# Patient Record
Sex: FEMALE | Race: BLACK OR AFRICAN AMERICAN | NOT HISPANIC OR LATINO | Employment: OTHER | URBAN - METROPOLITAN AREA
[De-identification: names, ages, dates, MRNs, and addresses within clinical notes are randomized per-mention and may not be internally consistent; named-entity substitution may affect disease eponyms.]

---

## 2024-07-21 ENCOUNTER — APPOINTMENT (OUTPATIENT)
Dept: RADIOLOGY | Facility: HOSPITAL | Age: 54
DRG: 101 | End: 2024-07-21
Payer: COMMERCIAL

## 2024-07-21 ENCOUNTER — APPOINTMENT (OUTPATIENT)
Dept: CARDIOLOGY | Facility: HOSPITAL | Age: 54
DRG: 101 | End: 2024-07-21
Payer: COMMERCIAL

## 2024-07-21 ENCOUNTER — HOSPITAL ENCOUNTER (INPATIENT)
Facility: HOSPITAL | Age: 54
DRG: 101 | End: 2024-07-21
Attending: STUDENT IN AN ORGANIZED HEALTH CARE EDUCATION/TRAINING PROGRAM | Admitting: INTERNAL MEDICINE
Payer: COMMERCIAL

## 2024-07-21 VITALS
RESPIRATION RATE: 16 BRPM | BODY MASS INDEX: 30.82 KG/M2 | SYSTOLIC BLOOD PRESSURE: 177 MMHG | OXYGEN SATURATION: 99 % | HEART RATE: 84 BPM | DIASTOLIC BLOOD PRESSURE: 99 MMHG | WEIGHT: 185 LBS | HEIGHT: 65 IN | TEMPERATURE: 98.6 F

## 2024-07-21 DIAGNOSIS — G35 HISTORY OF MULTIPLE SCLEROSIS (MULTI): ICD-10-CM

## 2024-07-21 DIAGNOSIS — R56.9 SEIZURE (MULTI): Primary | ICD-10-CM

## 2024-07-21 LAB
ALBUMIN SERPL BCP-MCNC: 4.1 G/DL (ref 3.4–5)
ALP SERPL-CCNC: 134 U/L (ref 33–110)
ALT SERPL W P-5'-P-CCNC: 13 U/L (ref 7–45)
AMPHETAMINES UR QL SCN: ABNORMAL
ANION GAP SERPL CALC-SCNC: 27 MMOL/L (ref 10–20)
APPEARANCE UR: CLEAR
AST SERPL W P-5'-P-CCNC: 31 U/L (ref 9–39)
B-HCG SERPL-ACNC: 3 MIU/ML
BARBITURATES UR QL SCN: ABNORMAL
BASOPHILS # BLD AUTO: 0.03 X10*3/UL (ref 0–0.1)
BASOPHILS NFR BLD AUTO: 0.4 %
BENZODIAZ UR QL SCN: ABNORMAL
BILIRUB SERPL-MCNC: 0.3 MG/DL (ref 0–1.2)
BILIRUB UR STRIP.AUTO-MCNC: NEGATIVE MG/DL
BUN SERPL-MCNC: 14 MG/DL (ref 6–23)
BZE UR QL SCN: ABNORMAL
CALCIUM SERPL-MCNC: 9.1 MG/DL (ref 8.6–10.3)
CANNABINOIDS UR QL SCN: ABNORMAL
CHLORIDE SERPL-SCNC: 104 MMOL/L (ref 98–107)
CO2 SERPL-SCNC: 14 MMOL/L (ref 21–32)
COLOR UR: YELLOW
CREAT SERPL-MCNC: 0.97 MG/DL (ref 0.5–1.05)
EGFRCR SERPLBLD CKD-EPI 2021: 69 ML/MIN/1.73M*2
EOSINOPHIL # BLD AUTO: 0.04 X10*3/UL (ref 0–0.7)
EOSINOPHIL NFR BLD AUTO: 0.6 %
ERYTHROCYTE [DISTWIDTH] IN BLOOD BY AUTOMATED COUNT: 15.8 % (ref 11.5–14.5)
FENTANYL+NORFENTANYL UR QL SCN: ABNORMAL
GLUCOSE BLD MANUAL STRIP-MCNC: 94 MG/DL (ref 74–99)
GLUCOSE BLD MANUAL STRIP-MCNC: 99 MG/DL (ref 74–99)
GLUCOSE SERPL-MCNC: 87 MG/DL (ref 74–99)
GLUCOSE UR STRIP.AUTO-MCNC: NORMAL MG/DL
HCT VFR BLD AUTO: 42.2 % (ref 36–46)
HGB BLD-MCNC: 12.5 G/DL (ref 12–16)
IMM GRANULOCYTES # BLD AUTO: 0.04 X10*3/UL (ref 0–0.7)
IMM GRANULOCYTES NFR BLD AUTO: 0.6 % (ref 0–0.9)
KETONES UR STRIP.AUTO-MCNC: NEGATIVE MG/DL
LACTATE SERPL-SCNC: 0.8 MMOL/L (ref 0.4–2)
LACTATE SERPL-SCNC: 12.6 MMOL/L (ref 0.4–2)
LEUKOCYTE ESTERASE UR QL STRIP.AUTO: ABNORMAL
LYMPHOCYTES # BLD AUTO: 3.34 X10*3/UL (ref 1.2–4.8)
LYMPHOCYTES NFR BLD AUTO: 47.4 %
MAGNESIUM SERPL-MCNC: 1.8 MG/DL (ref 1.6–2.4)
MAGNESIUM SERPL-MCNC: 1.9 MG/DL (ref 1.6–2.4)
MCH RBC QN AUTO: 27.1 PG (ref 26–34)
MCHC RBC AUTO-ENTMCNC: 29.6 G/DL (ref 32–36)
MCV RBC AUTO: 92 FL (ref 80–100)
METHADONE UR QL SCN: ABNORMAL
MONOCYTES # BLD AUTO: 1.08 X10*3/UL (ref 0.1–1)
MONOCYTES NFR BLD AUTO: 15.3 %
MUCOUS THREADS #/AREA URNS AUTO: NORMAL /LPF
NEUTROPHILS # BLD AUTO: 2.51 X10*3/UL (ref 1.2–7.7)
NEUTROPHILS NFR BLD AUTO: 35.7 %
NITRITE UR QL STRIP.AUTO: NEGATIVE
NRBC BLD-RTO: 0 /100 WBCS (ref 0–0)
OPIATES UR QL SCN: ABNORMAL
OXYCODONE+OXYMORPHONE UR QL SCN: ABNORMAL
PCP UR QL SCN: ABNORMAL
PH UR STRIP.AUTO: 6 [PH]
PLATELET # BLD AUTO: 363 X10*3/UL (ref 150–450)
POTASSIUM SERPL-SCNC: 3.5 MMOL/L (ref 3.5–5.3)
POTASSIUM SERPL-SCNC: 4.4 MMOL/L (ref 3.5–5.3)
PROT SERPL-MCNC: 7.1 G/DL (ref 6.4–8.2)
PROT UR STRIP.AUTO-MCNC: ABNORMAL MG/DL
RBC # BLD AUTO: 4.61 X10*6/UL (ref 4–5.2)
RBC # UR STRIP.AUTO: NEGATIVE /UL
RBC #/AREA URNS AUTO: NORMAL /HPF
SODIUM SERPL-SCNC: 141 MMOL/L (ref 136–145)
SP GR UR STRIP.AUTO: 1.03
SQUAMOUS #/AREA URNS AUTO: NORMAL /HPF
UROBILINOGEN UR STRIP.AUTO-MCNC: ABNORMAL MG/DL
WBC # BLD AUTO: 7 X10*3/UL (ref 4.4–11.3)
WBC #/AREA URNS AUTO: NORMAL /HPF

## 2024-07-21 PROCEDURE — 96366 THER/PROPH/DIAG IV INF ADDON: CPT

## 2024-07-21 PROCEDURE — 93005 ELECTROCARDIOGRAM TRACING: CPT

## 2024-07-21 PROCEDURE — 99285 EMERGENCY DEPT VISIT HI MDM: CPT

## 2024-07-21 PROCEDURE — 80307 DRUG TEST PRSMV CHEM ANLYZR: CPT | Performed by: STUDENT IN AN ORGANIZED HEALTH CARE EDUCATION/TRAINING PROGRAM

## 2024-07-21 PROCEDURE — 82947 ASSAY GLUCOSE BLOOD QUANT: CPT

## 2024-07-21 PROCEDURE — 2500000001 HC RX 250 WO HCPCS SELF ADMINISTERED DRUGS (ALT 637 FOR MEDICARE OP): Performed by: INTERNAL MEDICINE

## 2024-07-21 PROCEDURE — 70450 CT HEAD/BRAIN W/O DYE: CPT | Performed by: STUDENT IN AN ORGANIZED HEALTH CARE EDUCATION/TRAINING PROGRAM

## 2024-07-21 PROCEDURE — 96365 THER/PROPH/DIAG IV INF INIT: CPT | Mod: 59

## 2024-07-21 PROCEDURE — 1200000002 HC GENERAL ROOM WITH TELEMETRY DAILY

## 2024-07-21 PROCEDURE — 2500000004 HC RX 250 GENERAL PHARMACY W/ HCPCS (ALT 636 FOR OP/ED): Performed by: STUDENT IN AN ORGANIZED HEALTH CARE EDUCATION/TRAINING PROGRAM

## 2024-07-21 PROCEDURE — 99222 1ST HOSP IP/OBS MODERATE 55: CPT | Performed by: INTERNAL MEDICINE

## 2024-07-21 PROCEDURE — 83735 ASSAY OF MAGNESIUM: CPT | Performed by: STUDENT IN AN ORGANIZED HEALTH CARE EDUCATION/TRAINING PROGRAM

## 2024-07-21 PROCEDURE — 36415 COLL VENOUS BLD VENIPUNCTURE: CPT | Performed by: STUDENT IN AN ORGANIZED HEALTH CARE EDUCATION/TRAINING PROGRAM

## 2024-07-21 PROCEDURE — 80053 COMPREHEN METABOLIC PANEL: CPT | Performed by: STUDENT IN AN ORGANIZED HEALTH CARE EDUCATION/TRAINING PROGRAM

## 2024-07-21 PROCEDURE — 84132 ASSAY OF SERUM POTASSIUM: CPT | Performed by: STUDENT IN AN ORGANIZED HEALTH CARE EDUCATION/TRAINING PROGRAM

## 2024-07-21 PROCEDURE — 71045 X-RAY EXAM CHEST 1 VIEW: CPT | Performed by: STUDENT IN AN ORGANIZED HEALTH CARE EDUCATION/TRAINING PROGRAM

## 2024-07-21 PROCEDURE — 85025 COMPLETE CBC W/AUTO DIFF WBC: CPT | Performed by: STUDENT IN AN ORGANIZED HEALTH CARE EDUCATION/TRAINING PROGRAM

## 2024-07-21 PROCEDURE — 96374 THER/PROPH/DIAG INJ IV PUSH: CPT | Mod: 59

## 2024-07-21 PROCEDURE — 84702 CHORIONIC GONADOTROPIN TEST: CPT | Performed by: STUDENT IN AN ORGANIZED HEALTH CARE EDUCATION/TRAINING PROGRAM

## 2024-07-21 PROCEDURE — 96375 TX/PRO/DX INJ NEW DRUG ADDON: CPT

## 2024-07-21 PROCEDURE — 2500000004 HC RX 250 GENERAL PHARMACY W/ HCPCS (ALT 636 FOR OP/ED): Performed by: INTERNAL MEDICINE

## 2024-07-21 PROCEDURE — 70450 CT HEAD/BRAIN W/O DYE: CPT

## 2024-07-21 PROCEDURE — 87086 URINE CULTURE/COLONY COUNT: CPT | Mod: AHULAB | Performed by: STUDENT IN AN ORGANIZED HEALTH CARE EDUCATION/TRAINING PROGRAM

## 2024-07-21 PROCEDURE — 83605 ASSAY OF LACTIC ACID: CPT | Performed by: STUDENT IN AN ORGANIZED HEALTH CARE EDUCATION/TRAINING PROGRAM

## 2024-07-21 PROCEDURE — 2500000005 HC RX 250 GENERAL PHARMACY W/O HCPCS: Performed by: INTERNAL MEDICINE

## 2024-07-21 PROCEDURE — 2500000004 HC RX 250 GENERAL PHARMACY W/ HCPCS (ALT 636 FOR OP/ED)

## 2024-07-21 PROCEDURE — 96372 THER/PROPH/DIAG INJ SC/IM: CPT | Performed by: STUDENT IN AN ORGANIZED HEALTH CARE EDUCATION/TRAINING PROGRAM

## 2024-07-21 PROCEDURE — 81001 URINALYSIS AUTO W/SCOPE: CPT | Performed by: STUDENT IN AN ORGANIZED HEALTH CARE EDUCATION/TRAINING PROGRAM

## 2024-07-21 PROCEDURE — 71045 X-RAY EXAM CHEST 1 VIEW: CPT

## 2024-07-21 RX ORDER — MAGNESIUM SULFATE HEPTAHYDRATE 40 MG/ML
2 INJECTION, SOLUTION INTRAVENOUS ONCE
Status: COMPLETED | OUTPATIENT
Start: 2024-07-21 | End: 2024-07-21

## 2024-07-21 RX ORDER — PROCHLORPERAZINE EDISYLATE 5 MG/ML
10 INJECTION INTRAMUSCULAR; INTRAVENOUS ONCE
Status: COMPLETED | OUTPATIENT
Start: 2024-07-21 | End: 2024-07-21

## 2024-07-21 RX ORDER — LORAZEPAM 2 MG/ML
INJECTION INTRAMUSCULAR
Status: COMPLETED
Start: 2024-07-21 | End: 2024-07-21

## 2024-07-21 RX ORDER — ACETAMINOPHEN 325 MG/1
975 TABLET ORAL ONCE
Status: COMPLETED | OUTPATIENT
Start: 2024-07-21 | End: 2024-07-21

## 2024-07-21 RX ORDER — LEVETIRACETAM 10 MG/ML
1000 INJECTION INTRAVASCULAR ONCE
Status: COMPLETED | OUTPATIENT
Start: 2024-07-21 | End: 2024-07-21

## 2024-07-21 RX ORDER — ONDANSETRON HYDROCHLORIDE 2 MG/ML
4 INJECTION, SOLUTION INTRAVENOUS EVERY 8 HOURS PRN
Status: DISCONTINUED | OUTPATIENT
Start: 2024-07-21 | End: 2024-07-24 | Stop reason: HOSPADM

## 2024-07-21 RX ORDER — PANTOPRAZOLE SODIUM 40 MG/10ML
40 INJECTION, POWDER, LYOPHILIZED, FOR SOLUTION INTRAVENOUS
Status: DISCONTINUED | OUTPATIENT
Start: 2024-07-22 | End: 2024-07-24 | Stop reason: HOSPADM

## 2024-07-21 RX ORDER — LEVETIRACETAM 500 MG/1
500 TABLET ORAL 2 TIMES DAILY
Status: DISCONTINUED | OUTPATIENT
Start: 2024-07-22 | End: 2024-07-24 | Stop reason: HOSPADM

## 2024-07-21 RX ORDER — ACETAMINOPHEN 325 MG/1
650 TABLET ORAL EVERY 4 HOURS PRN
Status: DISCONTINUED | OUTPATIENT
Start: 2024-07-21 | End: 2024-07-24 | Stop reason: HOSPADM

## 2024-07-21 RX ORDER — ACETAMINOPHEN 160 MG/5ML
650 SOLUTION ORAL EVERY 4 HOURS PRN
Status: DISCONTINUED | OUTPATIENT
Start: 2024-07-21 | End: 2024-07-24 | Stop reason: HOSPADM

## 2024-07-21 RX ORDER — PANTOPRAZOLE SODIUM 40 MG/1
40 TABLET, DELAYED RELEASE ORAL
Status: DISCONTINUED | OUTPATIENT
Start: 2024-07-22 | End: 2024-07-24 | Stop reason: HOSPADM

## 2024-07-21 RX ORDER — SODIUM CHLORIDE 9 MG/ML
100 INJECTION, SOLUTION INTRAVENOUS CONTINUOUS
Status: DISCONTINUED | OUTPATIENT
Start: 2024-07-21 | End: 2024-07-22

## 2024-07-21 RX ORDER — MIDAZOLAM HYDROCHLORIDE 1 MG/ML
2 INJECTION, SOLUTION INTRAMUSCULAR; INTRAVENOUS ONCE
Status: COMPLETED | OUTPATIENT
Start: 2024-07-21 | End: 2024-07-21

## 2024-07-21 RX ORDER — ENOXAPARIN SODIUM 100 MG/ML
40 INJECTION SUBCUTANEOUS EVERY 24 HOURS
Status: DISCONTINUED | OUTPATIENT
Start: 2024-07-21 | End: 2024-07-24 | Stop reason: HOSPADM

## 2024-07-21 RX ORDER — ACETAMINOPHEN 650 MG/1
650 SUPPOSITORY RECTAL EVERY 4 HOURS PRN
Status: DISCONTINUED | OUTPATIENT
Start: 2024-07-21 | End: 2024-07-24 | Stop reason: HOSPADM

## 2024-07-21 RX ORDER — DROPERIDOL 2.5 MG/ML
1.25 INJECTION, SOLUTION INTRAMUSCULAR; INTRAVENOUS ONCE
Status: COMPLETED | OUTPATIENT
Start: 2024-07-21 | End: 2024-07-21

## 2024-07-21 RX ORDER — CLONIDINE HYDROCHLORIDE 0.1 MG/1
0.1 TABLET ORAL EVERY 12 HOURS SCHEDULED
Status: DISCONTINUED | OUTPATIENT
Start: 2024-07-21 | End: 2024-07-23

## 2024-07-21 RX ORDER — METOPROLOL TARTRATE 50 MG/1
50 TABLET ORAL 2 TIMES DAILY
Status: DISCONTINUED | OUTPATIENT
Start: 2024-07-21 | End: 2024-07-23

## 2024-07-21 RX ORDER — HYDROMORPHONE HYDROCHLORIDE 0.2 MG/ML
0.2 INJECTION INTRAMUSCULAR; INTRAVENOUS; SUBCUTANEOUS EVERY 4 HOURS PRN
Status: DISCONTINUED | OUTPATIENT
Start: 2024-07-21 | End: 2024-07-24 | Stop reason: HOSPADM

## 2024-07-21 RX ORDER — ONDANSETRON 4 MG/1
4 TABLET, FILM COATED ORAL EVERY 8 HOURS PRN
Status: DISCONTINUED | OUTPATIENT
Start: 2024-07-21 | End: 2024-07-24 | Stop reason: HOSPADM

## 2024-07-21 RX ORDER — METOPROLOL TARTRATE 1 MG/ML
5 INJECTION, SOLUTION INTRAVENOUS ONCE
Status: COMPLETED | OUTPATIENT
Start: 2024-07-21 | End: 2024-07-21

## 2024-07-21 RX ADMIN — ONDANSETRON 4 MG: 2 INJECTION INTRAMUSCULAR; INTRAVENOUS at 23:15

## 2024-07-21 RX ADMIN — ACETAMINOPHEN 650 MG: 325 TABLET ORAL at 21:02

## 2024-07-21 RX ADMIN — HYDROMORPHONE HYDROCHLORIDE 0.4 MG: 1 INJECTION, SOLUTION INTRAMUSCULAR; INTRAVENOUS; SUBCUTANEOUS at 23:11

## 2024-07-21 RX ADMIN — HYDROMORPHONE HYDROCHLORIDE 0.2 MG: 0.2 INJECTION, SOLUTION INTRAMUSCULAR; INTRAVENOUS; SUBCUTANEOUS at 21:34

## 2024-07-21 RX ADMIN — METOPROLOL TARTRATE 5 MG: 5 INJECTION INTRAVENOUS at 21:33

## 2024-07-21 RX ADMIN — CLONIDINE HYDROCHLORIDE 0.1 MG: 0.1 TABLET ORAL at 21:02

## 2024-07-21 RX ADMIN — LORAZEPAM: 2 INJECTION, SOLUTION INTRAMUSCULAR; INTRAVENOUS at 21:30

## 2024-07-21 RX ADMIN — DROPERIDOL 1.25 MG: 2.5 INJECTION, SOLUTION INTRAMUSCULAR; INTRAVENOUS at 17:37

## 2024-07-21 RX ADMIN — ENOXAPARIN SODIUM 40 MG: 40 INJECTION SUBCUTANEOUS at 21:33

## 2024-07-21 RX ADMIN — SODIUM CHLORIDE 100 ML/HR: 9 INJECTION, SOLUTION INTRAVENOUS at 21:23

## 2024-07-21 RX ADMIN — DEXTROSE MONOHYDRATE 1000 MG: 50 INJECTION, SOLUTION INTRAVENOUS at 18:17

## 2024-07-21 RX ADMIN — MIDAZOLAM HYDROCHLORIDE 2 MG: 1 INJECTION, SOLUTION INTRAMUSCULAR; INTRAVENOUS at 15:45

## 2024-07-21 RX ADMIN — MAGNESIUM SULFATE HEPTAHYDRATE 2 G: 40 INJECTION, SOLUTION INTRAVENOUS at 16:16

## 2024-07-21 RX ADMIN — ACETAMINOPHEN 975 MG: 325 TABLET ORAL at 16:16

## 2024-07-21 RX ADMIN — LEVETIRACETAM 1000 MG: 10 INJECTION INTRAVENOUS at 21:02

## 2024-07-21 RX ADMIN — PROCHLORPERAZINE EDISYLATE 10 MG: 5 INJECTION INTRAMUSCULAR; INTRAVENOUS at 16:16

## 2024-07-21 RX ADMIN — SODIUM CHLORIDE, POTASSIUM CHLORIDE, SODIUM LACTATE AND CALCIUM CHLORIDE 1000 ML: 600; 310; 30; 20 INJECTION, SOLUTION INTRAVENOUS at 16:16

## 2024-07-21 ASSESSMENT — ENCOUNTER SYMPTOMS
WEAKNESS: 1
RESPIRATORY NEGATIVE: 1
FATIGUE: 1
MUSCULOSKELETAL NEGATIVE: 1
ENDOCRINE NEGATIVE: 1
ALLERGIC/IMMUNOLOGIC NEGATIVE: 1
PSYCHIATRIC NEGATIVE: 1
EYES NEGATIVE: 1
CARDIOVASCULAR NEGATIVE: 1
ACTIVITY CHANGE: 1
SEIZURES: 1
GASTROINTESTINAL NEGATIVE: 1
HEMATOLOGIC/LYMPHATIC NEGATIVE: 1

## 2024-07-21 ASSESSMENT — PAIN SCALES - GENERAL
PAINLEVEL_OUTOF10: 10 - WORST POSSIBLE PAIN
PAINLEVEL_OUTOF10: 10 - WORST POSSIBLE PAIN
PAINLEVEL_OUTOF10: 7
PAINLEVEL_OUTOF10: 10 - WORST POSSIBLE PAIN
PAINLEVEL_OUTOF10: 0 - NO PAIN

## 2024-07-21 ASSESSMENT — PAIN - FUNCTIONAL ASSESSMENT
PAIN_FUNCTIONAL_ASSESSMENT: 0-10

## 2024-07-21 NOTE — ED NOTES
Female RN present while straight cath completed. Urine specimen obtained and sent to lab.      Adalberto Chester RN  07/21/24 5579

## 2024-07-21 NOTE — H&P
History Of Present Illness  Schoen D Traywick is a 53 y.o. female with a past medical history of multiple sclerosis with recent MS flare discharged on 7/9/2024 who presented via EMS to [facility] ER after she had an apparent seizure.  Her family endorses that she had generalized tonic-clonic type activity followed by postictal type recovery state.  While in the ER she had 2 more episodes of general tonic-clonic activity but was immediately awake and alert afterwards.  Her lactate was 12. he was loaded with Keppra in the ER, she also complains of feeling weak all over like she might be having a MS flare.  She resume her 1000 mg of Solu-Medrol in the ER.  Denies any fever or chills chest pain or shortness of breath.  No abdominal pain nausea vomiting or diarrhea.     Past Medical History  Past Medical History:   Diagnosis Date    Other conditions influencing health status 04/29/2015    Mammogram normal        Surgical History  No past surgical history on file.      Social History  She has no history on file for tobacco use, alcohol use, and drug use.    Family History  No family history on file.     Allergies  Patient has no allergy information on record.    Review of Systems   Constitutional:  Positive for activity change and fatigue.   HENT: Negative.     Eyes: Negative.    Respiratory: Negative.     Cardiovascular: Negative.    Gastrointestinal: Negative.    Endocrine: Negative.    Genitourinary: Negative.    Musculoskeletal: Negative.    Skin: Negative.    Allergic/Immunologic: Negative.    Neurological:  Positive for seizures and weakness.        Generalized weakness   Hematological: Negative.    Psychiatric/Behavioral: Negative.     All other systems reviewed and are negative.       Physical Exam  Vitals and nursing note reviewed.   Constitutional:       Appearance: Normal appearance. She is obese.   HENT:      Head: Normocephalic.      Right Ear: External ear normal.      Left Ear: External ear  "normal.      Nose: Nose normal.      Mouth/Throat:      Mouth: Mucous membranes are dry.      Pharynx: Oropharynx is clear.   Eyes:      Extraocular Movements: Extraocular movements intact.      Conjunctiva/sclera: Conjunctivae normal.      Pupils: Pupils are equal, round, and reactive to light.   Cardiovascular:      Rate and Rhythm: Normal rate and regular rhythm.   Pulmonary:      Effort: Pulmonary effort is normal.      Breath sounds: Normal breath sounds.   Abdominal:      General: Abdomen is flat. Bowel sounds are normal.      Palpations: Abdomen is soft.   Musculoskeletal:         General: Normal range of motion.   Skin:     General: Skin is warm and dry.   Neurological:      General: No focal deficit present.      Mental Status: She is alert. Mental status is at baseline.   Psychiatric:         Mood and Affect: Mood normal.         Behavior: Behavior normal.          Last Recorded Vitals  Blood pressure (!) 162/97, pulse 78, temperature 37.1 °C (98.8 °F), temperature source Oral, resp. rate 16, height 1.651 m (5' 5\"), weight 83.9 kg (185 lb), SpO2 100%.    Relevant Results  Meds:  Scheduled medications  enoxaparin, 40 mg, subcutaneous, q24h  [START ON 7/22/2024] pantoprazole, 40 mg, oral, Daily before breakfast   Or  [START ON 7/22/2024] pantoprazole, 40 mg, intravenous, Daily before breakfast      Continuous medications  sodium chloride 0.9%, 100 mL/hr      PRN medications  PRN medications: acetaminophen **OR** acetaminophen **OR** acetaminophen, ondansetron **OR** ondansetron   No current outpatient medications     Labs:  Results for orders placed or performed during the hospital encounter of 07/21/24 (from the past 24 hour(s))   POCT GLUCOSE   Result Value Ref Range    POCT Glucose 94 74 - 99 mg/dL   CBC and Auto Differential   Result Value Ref Range    WBC 7.0 4.4 - 11.3 x10*3/uL    nRBC 0.0 0.0 - 0.0 /100 WBCs    RBC 4.61 4.00 - 5.20 x10*6/uL    Hemoglobin 12.5 12.0 - 16.0 g/dL    Hematocrit 42.2 36.0 - " 46.0 %    MCV 92 80 - 100 fL    MCH 27.1 26.0 - 34.0 pg    MCHC 29.6 (L) 32.0 - 36.0 g/dL    RDW 15.8 (H) 11.5 - 14.5 %    Platelets 363 150 - 450 x10*3/uL    Neutrophils % 35.7 40.0 - 80.0 %    Immature Granulocytes %, Automated 0.6 0.0 - 0.9 %    Lymphocytes % 47.4 13.0 - 44.0 %    Monocytes % 15.3 2.0 - 10.0 %    Eosinophils % 0.6 0.0 - 6.0 %    Basophils % 0.4 0.0 - 2.0 %    Neutrophils Absolute 2.51 1.20 - 7.70 x10*3/uL    Immature Granulocytes Absolute, Automated 0.04 0.00 - 0.70 x10*3/uL    Lymphocytes Absolute 3.34 1.20 - 4.80 x10*3/uL    Monocytes Absolute 1.08 (H) 0.10 - 1.00 x10*3/uL    Eosinophils Absolute 0.04 0.00 - 0.70 x10*3/uL    Basophils Absolute 0.03 0.00 - 0.10 x10*3/uL   Comprehensive metabolic panel   Result Value Ref Range    Glucose 87 74 - 99 mg/dL    Sodium 141 136 - 145 mmol/L    Potassium 4.4 3.5 - 5.3 mmol/L    Chloride 104 98 - 107 mmol/L    Bicarbonate 14 (L) 21 - 32 mmol/L    Anion Gap 27 (H) 10 - 20 mmol/L    Urea Nitrogen 14 6 - 23 mg/dL    Creatinine 0.97 0.50 - 1.05 mg/dL    eGFR 69 >60 mL/min/1.73m*2    Calcium 9.1 8.6 - 10.3 mg/dL    Albumin 4.1 3.4 - 5.0 g/dL    Alkaline Phosphatase 134 (H) 33 - 110 U/L    Total Protein 7.1 6.4 - 8.2 g/dL    AST 31 9 - 39 U/L    Bilirubin, Total 0.3 0.0 - 1.2 mg/dL    ALT 13 7 - 45 U/L   Magnesium   Result Value Ref Range    Magnesium 1.90 1.60 - 2.40 mg/dL   Lactate   Result Value Ref Range    Lactate 12.6 (HH) 0.4 - 2.0 mmol/L   Human Chorionic Gonadotropin, Serum Quantitative   Result Value Ref Range    HCG, Beta-Quantitative 3 <5 mIU/mL   Drug Screen, Urine   Result Value Ref Range    Amphetamine Screen, Urine Presumptive Negative Presumptive Negative    Barbiturate Screen, Urine Presumptive Positive (A) Presumptive Negative    Benzodiazepines Screen, Urine Presumptive Positive (A) Presumptive Negative    Cannabinoid Screen, Urine Presumptive Positive (A) Presumptive Negative    Cocaine Metabolite Screen, Urine Presumptive Negative  Presumptive Negative    Fentanyl Screen, Urine Presumptive Negative Presumptive Negative    Opiate Screen, Urine Presumptive Negative Presumptive Negative    Oxycodone Screen, Urine Presumptive Positive (A) Presumptive Negative    PCP Screen, Urine Presumptive Negative Presumptive Negative    Methadone Screen, Urine Presumptive Negative Presumptive Negative   Urinalysis with Reflex Culture and Microscopic   Result Value Ref Range    Color, Urine Yellow Light-Yellow, Yellow, Dark-Yellow    Appearance, Urine Clear Clear    Specific Gravity, Urine 1.029 1.005 - 1.035    pH, Urine 6.0 5.0, 5.5, 6.0, 6.5, 7.0, 7.5, 8.0    Protein, Urine 30 (1+) (A) NEGATIVE, 10 (TRACE), 20 (TRACE) mg/dL    Glucose, Urine Normal Normal mg/dL    Blood, Urine NEGATIVE NEGATIVE    Ketones, Urine NEGATIVE NEGATIVE mg/dL    Bilirubin, Urine NEGATIVE NEGATIVE    Urobilinogen, Urine 2 (1+) (A) Normal mg/dL    Nitrite, Urine NEGATIVE NEGATIVE    Leukocyte Esterase, Urine 25 Rob/µL (A) NEGATIVE   Microscopic Only, Urine   Result Value Ref Range    WBC, Urine 1-5 1-5, NONE /HPF    RBC, Urine NONE NONE, 1-2, 3-5 /HPF    Squamous Epithelial Cells, Urine 1-9 (SPARSE) Reference range not established. /HPF    Mucus, Urine FEW Reference range not established. /LPF   Potassium   Result Value Ref Range    Potassium 3.5 3.5 - 5.3 mmol/L   Magnesium   Result Value Ref Range    Magnesium 1.80 1.60 - 2.40 mg/dL   Lactate   Result Value Ref Range    Lactate 0.8 0.4 - 2.0 mmol/L      Imaging:  CT head wo IV contrast    Result Date: 7/21/2024  Interpreted By:  Josiah Henry, STUDY: CT HEAD WO IV CONTRAST;  7/21/2024 5:02 pm   INDICATION: Signs/Symptoms:seizures.   COMPARISON: None.   ACCESSION NUMBER(S): [id number]   ORDERING CLINICIAN: STEFFEN SIMERLINK   TECHNIQUE: Noncontrast axial CT scan of head was performed.   FINDINGS: Parenchyma: There is no intracranial hemorrhage. The grey-white differentiation is intact. There is no mass effect or midline shift.    CSF Spaces: The ventricles, sulci and basal cisterns are within normal limits for age.   Extra-Axial Fluid: There is no extraaxial fluid collection.   Calvarium: The calvarium is unremarkable.   Paranasal sinuses: Visualized paranasal sinuses are clear.   Mastoids: Clear.   Orbits: Normal.   Soft tissues: Unremarkable.       No acute intracranial hemorrhage, mass effect, or CT apparent acute infarct.   Signed by: Josiah Henry 7/21/2024 5:27 PM Dictation workstation:   [id number]    XR chest 1 view    Result Date: 7/21/2024  Interpreted By:  Kristine Arciniega, STUDY: XR CHEST 1 VIEW; 7/21/2024 4:06 pm   INDICATION: Signs/Symptoms:AMS   COMPARISON: Chest radiographs 07/08/2009   ACCESSION NUMBER(S): [id number]   ORDERING CLINICIAN: STEFFEN SIMERLINK   TECHNIQUE: Single frontal view of the chest performed.   FINDINGS: LINES AND DEVICES: New right IJ Port-A-Cath with tip in the right atrium..   LUNGS: Patchy airspace opacities in the right lung base. No pulmonary edema, pleural effusion or pneumothorax.   CARDIOMEDIASTINAL SILHOUETTE: The cardiomediastinal silhouette is within normal limits.       Mild right basilar atelectasis and/or airspace disease. Consider follow-up to resolution in 1-2 months.   MACRO None   Signed by: Kristine Arciniega 7/21/2024 4:33 PM Dictation workstation:   [id number]      Assessment/Plan   Seizure-like activity  Plan:  Loaded with Keppra in the ER, will continue Keppra 500 mg twice daily until seen by neurology.  Routine EEG    Generalized weakness possible MS flare  Plan:  Solu-Medrol 1000 mg IV given in the ER at 1000 mg for 2 more days  Await neurology input    Hypertension  Plan:  Will add clonidine 0.1 mg every 12 hours for now and reassess    Class I obesity BMI 30  Lifestyle modification    DVT prophylaxis  Plan:  Lovenox 40 mg subcutaneously daily  SCDs    I spent 60 minutes in the professional and overall care of this patient.      Adeel Queen DO

## 2024-07-21 NOTE — ED NOTES
"Pt sitting in the room quiet, when RN walked into the room pt started moaning. When asked what was wrong she said she was having pain. RN expressed tylenol was administered and pt responded with \"tylenol doesn't work\" repeat blood work drawn at this time. MD notified about pain.      Adalberto Chester RN  07/21/24 [id number]    "

## 2024-07-21 NOTE — ED NOTES
Pt medicated per orders. Pt able to follow commands and passed swallow eval. Karely tylenol; administered.      Adalberto Chester RN  07/21/24 5838

## 2024-07-21 NOTE — ED NOTES
Room phone hooked up and patient calling her family member. Pt requesting pain medication. MD notified.      Adalberto Chester RN  07/21/24 6144

## 2024-07-21 NOTE — ED PROVIDER NOTES
Emergency Department Provider Note        History of Present Illness     Chief Complaint: Seizure  History provided by: Patient and EMS  Limitations to History: Altered Mental Status  External Chart Review: See ED Course    HPI:  Schoen D Traywick is a 53 y.o. female with PMHx of multiple sclerosis presenting to the ED for seizure.    I personally discussed history with EMS who reports family called for the patient having a seizure.  They do not have any information as to how long the seizure lasted but reports that the patient has seemed postictal since.  They report normal D stick.  Did not administer any medications.  Family reported to them that patient has a history of MS.    History from patient is limited secondary to altered mental status, but patient nods that she has a history of MS    Physical Exam   Triage vitals:  T 37.1 °C (98.8 °F)  HR 97  BP (!) 183/106  RR 20  O2 99 % None (Room air)    Constitutional: Awake, alert, looking around room  HENT: Head atraumatic, mucous membranes moist  Eyes:  Conjunctivae normal  Neck:  Supple  Lungs: Clear to auscultation, breath sounds equal and symmetric, no wheezes, rales, or rhonchi  Heart: Regular rate and rhythm, no murmur, rub or gallop  Abdomen: Soft, nontender, nondistended, no rebound or guarding  Extremities: No lower extremity edema  Neuro: Alert, looking around room, moaning incomprehensibly, during exam began having generalized tonic-clonic movements  Skin: Warm, dry  Psych: Calm, cooperative      Medical Decision Making & ED Course   Medical Decision Making:  Schoen D Traywick is a 53 y.o. female with PMHx as above in HPI who presented to the ED for seizure. Patient was afebrile, hemodynamically stable, and satting on room air on arrival.     Exam as above.  Patient initially arrived to the ED with report from EMS that she had had no further seizures en route but was postictal.  During initial evaluation, D stick normal.  She was alert, looking  around the room, moaning incomprehensibly, but protecting airway.  She then began to have a generalized tonic-clonic appearing seizure with jerking of her bilateral upper and bilateral lower extremities that lasted for approximately 15 seconds and self resolved with the patient immediately becoming alert looking around the room.  Roughly 1 minute later she had isolated jerking of her left lower extremity that lasted for approximately 10 seconds and then self resolved after which she was again alert and looking around the room.  She was given a 2 mg dose of IM Versed.    CT head shows no acute process to explain possible seizures.  Chest x-ray without evidence of focal pneumonia, but does show likely atelectasis at the right lung base per radiology.    EKG showed NSR without signs of acute ischemia.    Labs below in ED course, notable for CBC: no anemia, no leukocytosis, BMP: no clinically significant electrolyte abnormalities, no EVAN LFT: no evidence of acute liver injury,   UA: Contaminated and likely not suggestive of UTI , her anion gap was mildly elevated and lactate later resulted elevated which is concerning as appears that patient may have had a true seizure though her witnessed seizure-like activity in the ED is possibly consistent with nonepileptic etiology given her quick return to being awake and alert.    She was observed in the ED and had no further seizure-like activity.  She began to complain of a headache that was throbbing in nature and was given a migraine cocktail with improvement in her symptoms.  She later reported that she felt as though she was having an MS flare because she felt weak all over.  On exam she has 2/5 strength in the bilateral upper extremities and 1/5 strength in the bilateral lower extremities which she reports is consistent with previous MS flares.  She reports she was recently discharged from a rehab facility following an admission for an MS flare and was able to walk when  she was discharged.    Thus, given concerns for MS flare she was given high-dose Methylpred and MRI of the neural axis was ordered.  MRI brain will also be helpful for further workup of the seizure.  Discussed with medicine for admission who requested patient be loaded with Keppra which was ordered.  She was admitted to medicine.  ------------------------------------------------  Independent Test Interpretation: See ED Course  Discussion with Other Providers: See ED Course    ED Course:  ED Course as of 07/22/24 0228   Sun Jul 21, 2024   1544 External chart review:  DC summary Dec 2023  Notes history of MS and functional neurologic disorder with seizure-like activity     [SS]   1545 POCT Glucose: 94  normal [SS]   1546 ECG 12 lead  I have personally reviewed and interpreted this EKG.  Normal sinus rhythm, rate 91 BPM  Normal axis  SD interval and QRS duration within normal limits.  QTc within normal limits.  No signs of acute ischemia or infarction   [SS]   1608 XR chest 1 view  I have personally reviewed and interpreted this CXR, which shows no PTX or focal PNA. Final decision making pending radiology read.   [SS]   1630 CBC and Auto Differential(!)  No leukocytosis, anemia, or thrombocytopenia   [SS]   1636 Comprehensive metabolic panel(!)  Mild elevation alk phos, potassium hemolyzed, no EVAN, no signs of acute liver injury.  Anion gap mildly elevated, lactate is pending which may be potential etiology if patient had a seizure [SS]   1637 HCG, Beta-Quantitative: 3  Not elevated [SS]   1637 MAGNESIUM: 1.90  Hemolyzed [SS]   1638 XR chest 1 view  Radiology read:  IMPRESSION:  Mild right basilar atelectasis and/or airspace disease. Consider  follow-up to resolution in 1-2 months.   [SS]   1656 Lactate(!!): 12.6  Elevated in setting of recent seizure [SS]   1657 Urinalysis with Reflex Culture and Microscopic(!)  Contaminated specimen, doubt UTI [SS]   1703 CT head wo IV contrast  I have personally reviewed and  interpreted this CT head, which shows no large ICH or masses. Final decision making pending radiology read.   [SS]   1724 MAGNESIUM: 1.80  normal [SS]   1724 POTASSIUM: 3.5  normal [SS]   1741 CT head wo IV contrast  Radiology read:  IMPRESSION:  No acute intracranial hemorrhage, mass effect, or CT apparent acute  infarct.       [SS]   1919 Lactate: 0.8  improved [SS]   1955 Personally discussed currently available facts and concerns about the case with Dr Queen with medicine, who advises accepts to medicine. Request load with keppra   [SS]      ED Course User Index  [SS] Steffen Simerlink, MD         Diagnoses as of 07/22/24 0228   Seizure (Multi)   History of multiple sclerosis (Multi)     Disposition   As a result of their workup, the patient will require admission to the hospital.  The patient was informed of her diagnosis.  The patient was given the opportunity to ask questions and I answered them. The patient agreed to be admitted to the hospital.    Procedures   Procedures    Steffen Simerlink, MD Steffen Simerlink, MD  07/22/24 2158

## 2024-07-21 NOTE — ED NOTES
"Pt BIBA with the c/o seizure like activity. Pt actively having tremors when brought into room 09, multiple RN and MD at bedside. Ems unable to gain IV access. RN placed 20G R-AC was able to pull labs but unable to thread IV catheter. IM versed administered with MD at bedside. Port accessed at this time. Per pt stating \"I don't like being poked I have a port for a reason\". Pt is a GCS 15 at this time. EKG completed, pt placed on cardiac monitor with cycling BP's and continuous pulse oximetry.      Adalberto Chester, NOLVIA  07/21/24 3436    "

## 2024-07-21 NOTE — ED NOTES
Pt experiencing whole body tremors, tremors stopped after 10 seconds, then patient continues to moan and follow commands.      Adalberto Chester RN  07/21/24 1600

## 2024-07-21 NOTE — ED NOTES
Pt answered no to MRI screening questions, states she had a MRI last month. Pt removed all jewelry.       Adalberto Chester RN  07/21/24 5517

## 2024-07-22 ENCOUNTER — APPOINTMENT (OUTPATIENT)
Dept: RADIOLOGY | Facility: HOSPITAL | Age: 54
DRG: 101 | End: 2024-07-22
Payer: COMMERCIAL

## 2024-07-22 LAB
ANION GAP SERPL CALC-SCNC: 15 MMOL/L (ref 10–20)
ATRIAL RATE: 91 BPM
BUN SERPL-MCNC: 8 MG/DL (ref 6–23)
CALCIUM SERPL-MCNC: 9 MG/DL (ref 8.6–10.3)
CHLORIDE SERPL-SCNC: 105 MMOL/L (ref 98–107)
CO2 SERPL-SCNC: 22 MMOL/L (ref 21–32)
CREAT SERPL-MCNC: 0.77 MG/DL (ref 0.5–1.05)
EGFRCR SERPLBLD CKD-EPI 2021: >90 ML/MIN/1.73M*2
ERYTHROCYTE [DISTWIDTH] IN BLOOD BY AUTOMATED COUNT: 15.3 % (ref 11.5–14.5)
GLUCOSE SERPL-MCNC: 155 MG/DL (ref 74–99)
HCT VFR BLD AUTO: 35.1 % (ref 36–46)
HGB BLD-MCNC: 11.5 G/DL (ref 12–16)
MCH RBC QN AUTO: 28 PG (ref 26–34)
MCHC RBC AUTO-ENTMCNC: 32.8 G/DL (ref 32–36)
MCV RBC AUTO: 85 FL (ref 80–100)
NRBC BLD-RTO: 0 /100 WBCS (ref 0–0)
P AXIS: 58 DEGREES
P OFFSET: 203 MS
P ONSET: 152 MS
PLATELET # BLD AUTO: 287 X10*3/UL (ref 150–450)
POTASSIUM SERPL-SCNC: 4.1 MMOL/L (ref 3.5–5.3)
PR INTERVAL: 144 MS
Q ONSET: 224 MS
QRS COUNT: 15 BEATS
QRS DURATION: 68 MS
QT INTERVAL: 328 MS
QTC CALCULATION(BAZETT): 403 MS
QTC FREDERICIA: 377 MS
R AXIS: 13 DEGREES
RBC # BLD AUTO: 4.11 X10*6/UL (ref 4–5.2)
SODIUM SERPL-SCNC: 138 MMOL/L (ref 136–145)
T AXIS: 21 DEGREES
T OFFSET: 388 MS
VENTRICULAR RATE: 91 BPM
WBC # BLD AUTO: 4.6 X10*3/UL (ref 4.4–11.3)

## 2024-07-22 PROCEDURE — 72141 MRI NECK SPINE W/O DYE: CPT

## 2024-07-22 PROCEDURE — 72146 MRI CHEST SPINE W/O DYE: CPT

## 2024-07-22 PROCEDURE — 72148 MRI LUMBAR SPINE W/O DYE: CPT | Performed by: RADIOLOGY

## 2024-07-22 PROCEDURE — 72141 MRI NECK SPINE W/O DYE: CPT | Performed by: RADIOLOGY

## 2024-07-22 PROCEDURE — 1200000002 HC GENERAL ROOM WITH TELEMETRY DAILY

## 2024-07-22 PROCEDURE — 70551 MRI BRAIN STEM W/O DYE: CPT | Performed by: RADIOLOGY

## 2024-07-22 PROCEDURE — 2500000004 HC RX 250 GENERAL PHARMACY W/ HCPCS (ALT 636 FOR OP/ED): Performed by: INTERNAL MEDICINE

## 2024-07-22 PROCEDURE — 2500000001 HC RX 250 WO HCPCS SELF ADMINISTERED DRUGS (ALT 637 FOR MEDICARE OP): Performed by: INTERNAL MEDICINE

## 2024-07-22 PROCEDURE — 72146 MRI CHEST SPINE W/O DYE: CPT | Performed by: RADIOLOGY

## 2024-07-22 PROCEDURE — 99221 1ST HOSP IP/OBS SF/LOW 40: CPT | Performed by: PSYCHIATRY & NEUROLOGY

## 2024-07-22 PROCEDURE — 95819 EEG AWAKE AND ASLEEP: CPT

## 2024-07-22 PROCEDURE — 80048 BASIC METABOLIC PNL TOTAL CA: CPT | Performed by: INTERNAL MEDICINE

## 2024-07-22 PROCEDURE — 70551 MRI BRAIN STEM W/O DYE: CPT

## 2024-07-22 PROCEDURE — 72148 MRI LUMBAR SPINE W/O DYE: CPT

## 2024-07-22 PROCEDURE — 99232 SBSQ HOSP IP/OBS MODERATE 35: CPT | Performed by: STUDENT IN AN ORGANIZED HEALTH CARE EDUCATION/TRAINING PROGRAM

## 2024-07-22 PROCEDURE — 95819 EEG AWAKE AND ASLEEP: CPT | Performed by: STUDENT IN AN ORGANIZED HEALTH CARE EDUCATION/TRAINING PROGRAM

## 2024-07-22 PROCEDURE — 85027 COMPLETE CBC AUTOMATED: CPT | Performed by: INTERNAL MEDICINE

## 2024-07-22 PROCEDURE — 2500000001 HC RX 250 WO HCPCS SELF ADMINISTERED DRUGS (ALT 637 FOR MEDICARE OP): Performed by: STUDENT IN AN ORGANIZED HEALTH CARE EDUCATION/TRAINING PROGRAM

## 2024-07-22 RX ORDER — FAMOTIDINE 40 MG/1
40 TABLET, FILM COATED ORAL DAILY
COMMUNITY

## 2024-07-22 RX ORDER — BUTALBITAL, ACETAMINOPHEN, CAFFEINE AND CODEINE PHOSPHATE 50; 325; 40; 30 MG/1; MG/1; MG/1; MG/1
1.5 CAPSULE ORAL DAILY PRN
Status: ON HOLD | COMMUNITY
End: 2024-07-22 | Stop reason: ENTERED-IN-ERROR

## 2024-07-22 RX ORDER — POLYETHYLENE GLYCOL 3350 17 G/17G
34 POWDER, FOR SOLUTION ORAL
COMMUNITY
End: 2024-07-24 | Stop reason: HOSPADM

## 2024-07-22 RX ORDER — OXYCODONE AND ACETAMINOPHEN 5; 325 MG/1; MG/1
1 TABLET ORAL EVERY 6 HOURS PRN
COMMUNITY

## 2024-07-22 RX ORDER — POTASSIUM CHLORIDE 750 MG/1
10 TABLET, FILM COATED, EXTENDED RELEASE ORAL EVERY OTHER DAY
COMMUNITY

## 2024-07-22 RX ORDER — PANTOPRAZOLE SODIUM 40 MG/1
40 TABLET, DELAYED RELEASE ORAL
COMMUNITY

## 2024-07-22 RX ORDER — TOPIRAMATE 25 MG/1
25 TABLET ORAL NIGHTLY
COMMUNITY

## 2024-07-22 RX ORDER — LOSARTAN POTASSIUM 50 MG/1
50 TABLET ORAL 2 TIMES DAILY
COMMUNITY

## 2024-07-22 RX ORDER — ACETAMINOPHEN 500 MG
5000 TABLET ORAL DAILY
Status: ON HOLD | COMMUNITY
End: 2024-07-22 | Stop reason: ENTERED-IN-ERROR

## 2024-07-22 RX ORDER — ACETAMINOPHEN 325 MG/1
650 TABLET ORAL EVERY 6 HOURS PRN
COMMUNITY
Start: 2024-07-09 | End: 2024-07-24 | Stop reason: HOSPADM

## 2024-07-22 RX ORDER — BUTALBITAL, ACETAMINOPHEN AND CAFFEINE 50; 325; 40 MG/1; MG/1; MG/1
1 TABLET ORAL EVERY 4 HOURS PRN
Status: DISCONTINUED | OUTPATIENT
Start: 2024-07-22 | End: 2024-07-24 | Stop reason: HOSPADM

## 2024-07-22 RX ORDER — VERAPAMIL HYDROCHLORIDE 120 MG/1
120 TABLET, FILM COATED ORAL 2 TIMES DAILY
COMMUNITY

## 2024-07-22 RX ORDER — DRONABINOL 10 MG/1
10 CAPSULE ORAL
COMMUNITY

## 2024-07-22 RX ORDER — TIZANIDINE 4 MG/1
4 TABLET ORAL 3 TIMES DAILY PRN
COMMUNITY

## 2024-07-22 RX ORDER — DULOXETIN HYDROCHLORIDE 30 MG/1
30 CAPSULE, DELAYED RELEASE ORAL 2 TIMES DAILY
COMMUNITY

## 2024-07-22 RX ORDER — BUTALBITAL, ACETAMINOPHEN AND CAFFEINE 300; 40; 50 MG/1; MG/1; MG/1
1 CAPSULE ORAL EVERY 4 HOURS PRN
Status: DISCONTINUED | OUTPATIENT
Start: 2024-07-22 | End: 2024-07-22

## 2024-07-22 RX ORDER — ASCORBIC ACID 250 MG
250 TABLET,CHEWABLE ORAL DAILY
COMMUNITY

## 2024-07-22 RX ORDER — DIPHENHYDRAMINE HYDROCHLORIDE 50 MG/ML
50 INJECTION INTRAMUSCULAR; INTRAVENOUS ONCE
Status: COMPLETED | OUTPATIENT
Start: 2024-07-22 | End: 2024-07-22

## 2024-07-22 RX ORDER — AMLODIPINE BESYLATE 10 MG/1
10 TABLET ORAL DAILY
Status: DISCONTINUED | OUTPATIENT
Start: 2024-07-23 | End: 2024-07-23

## 2024-07-22 RX ORDER — AMITRIPTYLINE HYDROCHLORIDE 10 MG/1
10 TABLET, FILM COATED ORAL NIGHTLY
COMMUNITY

## 2024-07-22 RX ORDER — AMLODIPINE BESYLATE 5 MG/1
5 TABLET ORAL DAILY
Status: DISCONTINUED | OUTPATIENT
Start: 2024-07-22 | End: 2024-07-22

## 2024-07-22 RX ORDER — MULTIVIT-MIN/IRON FUM/FOLIC AC 7.5 MG-4
1 TABLET ORAL DAILY
COMMUNITY

## 2024-07-22 RX ORDER — BUTALBITAL, ACETAMINOPHEN AND CAFFEINE 50; 325; 40 MG/1; MG/1; MG/1
1.5 TABLET ORAL DAILY PRN
COMMUNITY
Start: 2015-02-12 | End: 2024-08-10

## 2024-07-22 RX ORDER — ALBUTEROL SULFATE 90 UG/1
2 AEROSOL, METERED RESPIRATORY (INHALATION) EVERY 6 HOURS PRN
COMMUNITY

## 2024-07-22 RX ORDER — DIPHENHYDRAMINE HCL 25 MG
25 CAPSULE ORAL EVERY 4 HOURS PRN
Status: DISCONTINUED | OUTPATIENT
Start: 2024-07-22 | End: 2024-07-24 | Stop reason: HOSPADM

## 2024-07-22 RX ORDER — TALC
6 POWDER (GRAM) TOPICAL NIGHTLY PRN
COMMUNITY
End: 2024-07-24 | Stop reason: HOSPADM

## 2024-07-22 RX ORDER — NALOXONE HYDROCHLORIDE 4 MG/.1ML
1 SPRAY NASAL AS NEEDED
COMMUNITY

## 2024-07-22 RX ORDER — LEVOTHYROXINE SODIUM 100 UG/1
100 TABLET ORAL DAILY
COMMUNITY

## 2024-07-22 RX ORDER — BISMUTH SUBSALICYLATE 262 MG
1 TABLET,CHEWABLE ORAL DAILY
Status: ON HOLD | COMMUNITY
End: 2024-07-22 | Stop reason: ENTERED-IN-ERROR

## 2024-07-22 RX ORDER — CALCIUM CARBONATE 300MG(750)
400 TABLET,CHEWABLE ORAL NIGHTLY
COMMUNITY

## 2024-07-22 RX ORDER — ERGOCALCIFEROL 1.25 MG/1
1.25 CAPSULE ORAL
COMMUNITY

## 2024-07-22 RX ORDER — GABAPENTIN 300 MG/1
600 CAPSULE ORAL 3 TIMES DAILY
COMMUNITY

## 2024-07-22 RX ORDER — ACETAMINOPHEN 500 MG
5000 TABLET ORAL
COMMUNITY
Start: 2024-01-12

## 2024-07-22 RX ADMIN — HYDROMORPHONE HYDROCHLORIDE 0.4 MG: 1 INJECTION, SOLUTION INTRAMUSCULAR; INTRAVENOUS; SUBCUTANEOUS at 20:56

## 2024-07-22 RX ADMIN — DEXTROSE MONOHYDRATE 1000 MG: 50 INJECTION, SOLUTION INTRAVENOUS at 18:00

## 2024-07-22 RX ADMIN — HYDROMORPHONE HYDROCHLORIDE 0.4 MG: 1 INJECTION, SOLUTION INTRAMUSCULAR; INTRAVENOUS; SUBCUTANEOUS at 16:55

## 2024-07-22 RX ADMIN — DIPHENHYDRAMINE HYDROCHLORIDE 25 MG: 25 CAPSULE ORAL at 13:01

## 2024-07-22 RX ADMIN — HYDROMORPHONE HYDROCHLORIDE 0.4 MG: 1 INJECTION, SOLUTION INTRAMUSCULAR; INTRAVENOUS; SUBCUTANEOUS at 04:03

## 2024-07-22 RX ADMIN — PANTOPRAZOLE SODIUM 40 MG: 40 TABLET, DELAYED RELEASE ORAL at 06:57

## 2024-07-22 RX ADMIN — ENOXAPARIN SODIUM 40 MG: 40 INJECTION SUBCUTANEOUS at 20:52

## 2024-07-22 RX ADMIN — METOPROLOL TARTRATE 50 MG: 50 TABLET, FILM COATED ORAL at 20:52

## 2024-07-22 RX ADMIN — LEVETIRACETAM 500 MG: 500 TABLET, FILM COATED ORAL at 20:52

## 2024-07-22 RX ADMIN — CLONIDINE HYDROCHLORIDE 0.1 MG: 0.1 TABLET ORAL at 20:55

## 2024-07-22 RX ADMIN — METOPROLOL TARTRATE 50 MG: 50 TABLET, FILM COATED ORAL at 08:30

## 2024-07-22 RX ADMIN — ONDANSETRON 4 MG: 2 INJECTION INTRAMUSCULAR; INTRAVENOUS at 08:39

## 2024-07-22 RX ADMIN — LEVETIRACETAM 500 MG: 500 TABLET, FILM COATED ORAL at 08:30

## 2024-07-22 RX ADMIN — HYDROMORPHONE HYDROCHLORIDE 0.4 MG: 1 INJECTION, SOLUTION INTRAMUSCULAR; INTRAVENOUS; SUBCUTANEOUS at 08:30

## 2024-07-22 RX ADMIN — AMLODIPINE BESYLATE 5 MG: 5 TABLET ORAL at 04:36

## 2024-07-22 RX ADMIN — DIPHENHYDRAMINE HYDROCHLORIDE 50 MG: 50 INJECTION, SOLUTION INTRAMUSCULAR; INTRAVENOUS at 04:03

## 2024-07-22 RX ADMIN — CLONIDINE HYDROCHLORIDE 0.1 MG: 0.1 TABLET ORAL at 08:30

## 2024-07-22 RX ADMIN — DIPHENHYDRAMINE HYDROCHLORIDE 25 MG: 25 CAPSULE ORAL at 18:03

## 2024-07-22 RX ADMIN — BUTALBITAL, ACETAMINOPHEN, AND CAFFEINE 1 TABLET: 325; 50; 40 TABLET ORAL at 13:01

## 2024-07-22 RX ADMIN — HYDROMORPHONE HYDROCHLORIDE 0.4 MG: 1 INJECTION, SOLUTION INTRAMUSCULAR; INTRAVENOUS; SUBCUTANEOUS at 13:01

## 2024-07-22 RX ADMIN — HYDROMORPHONE HYDROCHLORIDE 0.2 MG: 0.2 INJECTION, SOLUTION INTRAMUSCULAR; INTRAVENOUS; SUBCUTANEOUS at 02:20

## 2024-07-22 RX ADMIN — DIPHENHYDRAMINE HYDROCHLORIDE 25 MG: 25 CAPSULE ORAL at 22:28

## 2024-07-22 SDOH — SOCIAL STABILITY: SOCIAL INSECURITY: HAVE YOU HAD ANY THOUGHTS OF HARMING ANYONE ELSE?: NO

## 2024-07-22 SDOH — SOCIAL STABILITY: SOCIAL INSECURITY: DO YOU FEEL ANYONE HAS EXPLOITED OR TAKEN ADVANTAGE OF YOU FINANCIALLY OR OF YOUR PERSONAL PROPERTY?: NO

## 2024-07-22 SDOH — SOCIAL STABILITY: SOCIAL INSECURITY: HAVE YOU HAD THOUGHTS OF HARMING ANYONE ELSE?: YES

## 2024-07-22 SDOH — SOCIAL STABILITY: SOCIAL INSECURITY: DO YOU FEEL UNSAFE GOING BACK TO THE PLACE WHERE YOU ARE LIVING?: NO

## 2024-07-22 SDOH — SOCIAL STABILITY: SOCIAL INSECURITY: ARE YOU OR HAVE YOU BEEN THREATENED OR ABUSED PHYSICALLY, EMOTIONALLY, OR SEXUALLY BY ANYONE?: NO

## 2024-07-22 SDOH — SOCIAL STABILITY: SOCIAL INSECURITY: ARE THERE ANY APPARENT SIGNS OF INJURIES/BEHAVIORS THAT COULD BE RELATED TO ABUSE/NEGLECT?: NO

## 2024-07-22 SDOH — SOCIAL STABILITY: SOCIAL INSECURITY: DOES ANYONE TRY TO KEEP YOU FROM HAVING/CONTACTING OTHER FRIENDS OR DOING THINGS OUTSIDE YOUR HOME?: NO

## 2024-07-22 SDOH — SOCIAL STABILITY: SOCIAL INSECURITY: HAS ANYONE EVER THREATENED TO HURT YOUR FAMILY OR YOUR PETS?: NO

## 2024-07-22 SDOH — SOCIAL STABILITY: SOCIAL INSECURITY: ABUSE: ADULT

## 2024-07-22 ASSESSMENT — LIFESTYLE VARIABLES
AUDIT-C TOTAL SCORE: 0
SKIP TO QUESTIONS 9-10: 1
HOW OFTEN DO YOU HAVE A DRINK CONTAINING ALCOHOL: NEVER
HOW MANY STANDARD DRINKS CONTAINING ALCOHOL DO YOU HAVE ON A TYPICAL DAY: PATIENT DOES NOT DRINK
SUBSTANCE_ABUSE_PAST_12_MONTHS: NO
AUDIT-C TOTAL SCORE: 0
PRESCIPTION_ABUSE_PAST_12_MONTHS: NO
HOW OFTEN DO YOU HAVE 6 OR MORE DRINKS ON ONE OCCASION: NEVER

## 2024-07-22 ASSESSMENT — COGNITIVE AND FUNCTIONAL STATUS - GENERAL
WALKING IN HOSPITAL ROOM: A LITTLE
DRESSING REGULAR LOWER BODY CLOTHING: A LITTLE
MOBILITY SCORE: 19
CLIMB 3 TO 5 STEPS WITH RAILING: A LITTLE
DAILY ACTIVITIY SCORE: 24
DRESSING REGULAR UPPER BODY CLOTHING: A LITTLE
HELP NEEDED FOR BATHING: A LITTLE
WALKING IN HOSPITAL ROOM: A LITTLE
MOBILITY SCORE: 19
MOVING TO AND FROM BED TO CHAIR: A LITTLE
DRESSING REGULAR LOWER BODY CLOTHING: A LITTLE
MOVING TO AND FROM BED TO CHAIR: A LITTLE
PATIENT BASELINE BEDBOUND: NO
CLIMB 3 TO 5 STEPS WITH RAILING: A LITTLE
DAILY ACTIVITIY SCORE: 20
TURNING FROM BACK TO SIDE WHILE IN FLAT BAD: A LITTLE
TOILETING: A LITTLE
DAILY ACTIVITIY SCORE: 20
STANDING UP FROM CHAIR USING ARMS: A LITTLE
TOILETING: A LITTLE
MOBILITY SCORE: 24
MOBILITY SCORE: 23
DRESSING REGULAR UPPER BODY CLOTHING: A LITTLE
WALKING IN HOSPITAL ROOM: A LITTLE
TURNING FROM BACK TO SIDE WHILE IN FLAT BAD: A LITTLE
HELP NEEDED FOR BATHING: A LITTLE
PATIENT BASELINE BEDBOUND: NO
STANDING UP FROM CHAIR USING ARMS: A LITTLE

## 2024-07-22 ASSESSMENT — ACTIVITIES OF DAILY LIVING (ADL)
BATHING: INDEPENDENT
PATIENT'S MEMORY ADEQUATE TO SAFELY COMPLETE DAILY ACTIVITIES?: YES
LACK_OF_TRANSPORTATION: PATIENT DECLINED
GROOMING: INDEPENDENT
ADEQUATE_TO_COMPLETE_ADL: YES
TOILETING: INDEPENDENT
ASSISTIVE_DEVICE: EYEGLASSES
HEARING - RIGHT EAR: FUNCTIONAL
LACK_OF_TRANSPORTATION: NO
JUDGMENT_ADEQUATE_SAFELY_COMPLETE_DAILY_ACTIVITIES: YES
WALKS IN HOME: INDEPENDENT
HEARING - LEFT EAR: FUNCTIONAL
DRESSING YOURSELF: INDEPENDENT
FEEDING YOURSELF: INDEPENDENT

## 2024-07-22 ASSESSMENT — PATIENT HEALTH QUESTIONNAIRE - PHQ9
SUM OF ALL RESPONSES TO PHQ9 QUESTIONS 1 & 2: 1
1. LITTLE INTEREST OR PLEASURE IN DOING THINGS: NOT AT ALL
2. FEELING DOWN, DEPRESSED OR HOPELESS: SEVERAL DAYS

## 2024-07-22 ASSESSMENT — PAIN SCALES - GENERAL
PAINLEVEL_OUTOF10: 9
PAINLEVEL_OUTOF10: 10 - WORST POSSIBLE PAIN
PAINLEVEL_OUTOF10: 10 - WORST POSSIBLE PAIN
PAINLEVEL_OUTOF10: 8
PAINLEVEL_OUTOF10: 10 - WORST POSSIBLE PAIN
PAINLEVEL_OUTOF10: 7
PAINLEVEL_OUTOF10: 7

## 2024-07-22 ASSESSMENT — PAIN DESCRIPTION - LOCATION
LOCATION: LEG

## 2024-07-22 ASSESSMENT — PAIN DESCRIPTION - ORIENTATION
ORIENTATION: RIGHT;LEFT

## 2024-07-22 ASSESSMENT — PAIN - FUNCTIONAL ASSESSMENT
PAIN_FUNCTIONAL_ASSESSMENT: 0-10

## 2024-07-22 NOTE — NURSING NOTE
Rapid Response Nurse Note:     Schoen D Traywick is a 53 y.o. female on day 1 of admission presenting with Seizure (Multi).    Rounded on patient due to recent rapid response, reviewed patient's chart and checked with bedside nurse for any questions or concerns. There were none voiced at this time.    The patient's current RADAR score is 0    /87 (BP Location: Right arm, Patient Position: Lying)   Pulse 81   Temp 35.8 °C (96.4 °F) (Temporal)   Resp 20   Wt 83.9 kg (185 lb)   SpO2 98%     No signs/symptoms of distress at this time. Bedside nurse notified to escalate concerns if patient condition changes      Merrick Anderson RN

## 2024-07-22 NOTE — CONSULTS
"Inpatient consult to Neurology  Consult performed by: Og Bess MD  Consult ordered by: Adeel Queen DO          History Of Present Illness  Schoen D Traywick is a 53 y.o. female presenting with seizures.    She has past medical history significant for multiple sclerosis which she indicates was diagnosed in 1998, followed by Dr. Jeffrey Woods and maintained currently on Ocrevus.  She indicates her presentation with MS was characterized by binocular visual disturbance and gait instability with falls.  She reports being ambulatory at baseline with a walker.    She indicates a history of seizures apparently attributed to MS, since roughly 2000.  She indicates being on a twice daily anticonvulsant medication that \"starts with an S\" but cannot name it.    She is additionally on a number of medications for migraine.    She indicates a recent hospitalization at [facility] for MS exacerbation with increased bilateral lower extremity and left upper extremity weakness.  She was just discharged on 7/9.    She indicates yesterday in the context of family arguing, and in the context of having lapsed off her seizure medication about 2 weeks previous due to running out of it, she had 2 episodes suggestive of seizure.  She indicates being told that she slid down to the floor and was shaking bilaterally for a matter of seconds, then had a second episode of shaking bilaterally for about a minute.  EMS gave her Versed.    She indicates left posterolateral tongue bite.    Her understanding is that her seizures over the years have had generalized tonic-clonic semiology.    She does not endorse an obvious prodrome.    She indicates that she does not drive.    She reports prior to lapsing off her seizure medication several days ago she had been compliant with it.    I reviewed labs which show urine drug screen positive for barbiturates, benzodiazepines (given Versed and takes Fioricet for headache), cannabinoids and oxycodone.  " Urinalysis showing negative nitrite, positive leukocyte esterase.  CMP showing normal sodium, liver function and renal function.    I reviewed brain MRI which shows negative diffusion.  Moderate burden of periventricular and subcortical lesions compatible with multiple sclerosis.  No gadolinium was given.  I reviewed spine MRIs.  Cervical and thoracic regions show patchy cord signal abnormalities without expansile features.  No extrinsic compression.  The most notable cord lesion appears to be at T4.  Again, gadolinium was not given.  Lumbar spine MRI shows widely patent central canal and preserved sagittal alignment.    She has been started here on Keppra.    An EEG was done here and read as normal.      Past Medical History  Past Medical History:   Diagnosis Date    Other conditions influencing health status 04/29/2015    Mammogram normal     Surgical History  No past surgical history on file.  Social History  Social History     Tobacco Use    Smoking status: Never    Smokeless tobacco: Never     Allergies  Baclofen, Iodinated contrast media, and Morphine  Medications Prior to Admission   Medication Sig Dispense Refill Last Dose    acetaminophen (Tylenol) 325 mg tablet Take 2 tablets (650 mg) by mouth every 6 hours if needed.       butalbital-acetaminophen-caff -40 mg tablet Take 1.5 tablets by mouth once daily as needed for headaches or migraine.       cholecalciferol (Vitamin D-3) 5,000 Units tablet Take 1 tablet (5,000 Units) by mouth once daily.       albuterol 90 mcg/actuation inhaler Inhale 2 puffs every 6 hours if needed for wheezing.       amitriptyline (Elavil) 10 mg tablet Take 1 tablet (10 mg) by mouth once daily at bedtime.       ascorbic acid (Vitamin C) 250 MG chewable tablet Chew 1 tablet (250 mg) once daily.       diphenhydrAMINE (BENADryl) 50 mg tablet Take 1 tablet (50 mg) by mouth every 6 hours if needed for itching.       dronabinol (Marinol) 10 mg capsule Take 1 capsule (10 mg) by mouth  2 times a day before meals.       DULoxetine (Cymbalta) 30 mg DR capsule Take 1 capsule (30 mg) by mouth 2 times a day. Do not crush or chew.       ergocalciferol (Vitamin D-2) 1.25 MG (81968 UT) capsule Take 1 capsule (1,250 mcg) by mouth 1 (one) time per week. MONDAY       famotidine (Pepcid) 40 mg tablet Take 1 tablet (40 mg) by mouth once daily.       gabapentin (Neurontin) 300 mg capsule Take 2 capsules (600 mg) by mouth 3 times a day.       levothyroxine (Synthroid, Levoxyl) 100 mcg tablet Take 1 tablet (100 mcg) by mouth early in the morning.. Take on an empty stomach at the same time each day, either 30 to 60 minutes prior to breakfast       losartan (Cozaar) 50 mg tablet Take 1 tablet (50 mg) by mouth 2 times a day.       magnesium oxide (Mag-Ox) 400 mg tablet Take 1 tablet (400 mg) by mouth once daily at bedtime.       melatonin 3 mg tablet Take 2 tablets (6 mg) by mouth as needed at bedtime for sleep.       multivitamin with minerals tablet Take 1 tablet by mouth once daily.       naloxone (Narcan) 4 mg/0.1 mL nasal spray Administer 1 spray (4 mg) into affected nostril(s) if needed for opioid reversal. May repeat every 2-3 minutes if needed, alternating nostrils, until medical assistance becomes available.       oxyCODONE-acetaminophen (Percocet) 5-325 mg tablet Take 1 tablet by mouth every 6 hours if needed for severe pain (7 - 10).       pantoprazole (ProtoNix) 40 mg EC tablet Take 1 tablet (40 mg) by mouth once daily in the morning. Take before meals. Do not crush, chew, or split.       polyethylene glycol (Glycolax, Miralax) 17 gram packet Take 34 g by mouth 2 times a day before meals.       potassium chloride CR 10 mEq ER tablet Take 1 tablet (10 mEq) by mouth every other day. Do not crush, chew, or split.       tiZANidine (Zanaflex) 4 mg tablet Take 1 tablet (4 mg) by mouth 3 times a day as needed for muscle spasms.       topiramate (Topamax) 25 mg tablet Take 1 tablet (25 mg) by mouth once daily at  bedtime.       verapamil (Calan) 120 mg tablet Take 1 tablet (120 mg) by mouth 2 times a day.          Review of Systems    Review of Systems:  Neurologic:  As per the history of present illness.  Positive for headache for which she received Fioricet.  Constitutional:  Negative for fever.  Musculoskeletal: As noted for neck and back pain. Cardiovascular:  Negative for chest pain.  Respiratory:  Negative for dyspnea.  Eyes:  Negative for acute vision change.  ENT:  Negative for acute hearing change.  GI:  Negative for vomiting.         Neurological Exam  Physical Exam    General: Alert, sitting up in bed, no acute distress.    Mental status: Clear sensorium without fluctuation.  Appropriate in conversation.  Oriented to self, date, [place].  Not specific as to suburb.  Language intact and fluent without paraphasic errors.  Followed instructions accurately and promptly.    Cranial nerves: Pupils equal, round and reactive to light.  Extraocular movements intact and conjugate without nystagmus.  No ptosis.  Visual fields full to confrontation OS and she reported baseline vision of only roughly hand motion OD.  Facial movements symmetrically intact.  Hearing grossly intact for purposes of conversation.  No dysarthria.  Midline tongue.    Motor: Left upper extremity drifted downward without pronation.  Right upper extremity confrontation strength was at least 4+.  Left upper extremity was 4 - to 4 with giveaway weakness at middle deltoid, 4+ biceps, 4 - to 4 triceps, 4-4+ distally.  Right lower extremity was 4 - hip flexion, 4 - quadriceps, and she was able to dorsiflex the great toe.  Left lower extremity was barely 3 hip flexion, less than 3 quadriceps and with no movement about the ankle.    Coordination: Finger to finger was accurate bilaterally without dysmetria or intention tremor.  No rest tremor or myoclonus.    Tendon reflexes: Symmetrically trace biceps, brachioradialis and patellar, neutral  "plantars.      Last Recorded Vitals  Blood pressure 152/87, pulse 81, temperature 35.8 °C (96.4 °F), temperature source Temporal, resp. rate 20, height 1.651 m (5' 5\"), weight 83.9 kg (185 lb), SpO2 98%.    Relevant Results                    [place] Coma Scale  Best Eye Response: Spontaneous  Best Verbal Response: Oriented  Best Motor Response: Follows commands  Judy Coma Scale Score: 15                 I have personally reviewed the following imaging results EEG    Result Date: 7/22/2024  IMPRESSION Impression This routine EEG is normal. No epileptiform activity is recorded. A full report will be scanned into the patient's chart at a later time. This report has been interpreted and electronically signed by    ECG 12 lead    Result Date: 7/22/2024  Normal sinus rhythm Nonspecific T wave abnormality Abnormal ECG When compared with ECG of 11-JUL-2006 13:34, Nonspecific T wave abnormality now evident in Lateral leads    CT head wo IV contrast    Result Date: 7/21/2024  Interpreted By:  Josiah Henry, STUDY: CT HEAD WO IV CONTRAST;  7/21/2024 5:02 pm   INDICATION: Signs/Symptoms:seizures.   COMPARISON: None.   ACCESSION NUMBER(S): [id number]   ORDERING CLINICIAN: STEFFEN SIMERLINK   TECHNIQUE: Noncontrast axial CT scan of head was performed.   FINDINGS: Parenchyma: There is no intracranial hemorrhage. The grey-white differentiation is intact. There is no mass effect or midline shift.   CSF Spaces: The ventricles, sulci and basal cisterns are within normal limits for age.   Extra-Axial Fluid: There is no extraaxial fluid collection.   Calvarium: The calvarium is unremarkable.   Paranasal sinuses: Visualized paranasal sinuses are clear.   Mastoids: Clear.   Orbits: Normal.   Soft tissues: Unremarkable.       No acute intracranial hemorrhage, mass effect, or CT apparent acute infarct.   Signed by: Josiah Henry 7/21/2024 5:27 PM Dictation workstation:   [id number]       Assessment/Plan     Breakthrough " seizures in a patient who reports an established history of seizures dating to roughly 2000, in the context of multiple sclerosis.    It is not clear what if anything she was taking as an anticonvulsant as an outpatient.  I do not see it on her home meds list.  She has been started here on Keppra and it is reasonable to continue at a dose of 500 mg twice daily pending her next appointment with her outpatient neurologist.    As above, she is a nondriver and she should remain on nondriving status.    Await official reading of MRIs.    No new recommendations at this time.                       Og Bess MD

## 2024-07-22 NOTE — SIGNIFICANT EVENT
Rapid called for General Tonic clonic tonic clonic activity.   Glucose 99   VS - 37, 84 176 177/99  Physical exam:  Constitutional Lethargic after ativan bout follows commands  Neuro: no tremor, no focal deficits  CV: Heart is regular rate and rhythm  Pulm: Lungs are clear to auscultation B/L  GI: Abdomen is soft nontender  Skin: Intact  Musculoskeletal Moves all 4 extremities  No calf tenderness  Extremities: No edema.    Tonic clonic seizure    Plan  Glucose 99  Ativan 2 mg IV during rapid  Keppra load ordered  in ER is being given now  EEG in am  Neuro consult

## 2024-07-22 NOTE — CARE PLAN
The patient's goals for the shift include      The clinical goals for the shift include pt will remain safe      Problem: Pain  Goal: Takes deep breaths with improved pain control throughout the shift  Outcome: Progressing  Goal: Performs ADL's with improved pain control throughout shift  Outcome: Progressing

## 2024-07-22 NOTE — PROGRESS NOTES
07/22/24 1224   Discharge Planning   Living Arrangements Children;Family members   Support Systems Children;Family members   Assistance Needed independent at baseline with a quad cane as needed   Type of Residence Private residence   Who is requesting discharge planning? Provider   Home or Post Acute Services None   Expected Discharge Disposition Home   Does the patient need discharge transport arranged? No   Housing Stability   In the last 12 months, was there a time when you were not able to pay the mortgage or rent on time? N   In the past 12 months, how many times have you moved where you were living? 1   At any time in the past 12 months, were you homeless or living in a shelter (including now)? N   Transportation Needs   In the past 12 months, has lack of transportation kept you from medical appointments or from getting medications? no   In the past 12 months, has lack of transportation kept you from meetings, work, or from getting things needed for daily living? No     Met with patient at bedside  Explained role of TCC  Patient admitted for seizure/MS flare    Patient independent at baseline, plans to return home with son and grandson, also has a daughter who is avail as needed, able to drive self to appts and to obtain meds, pcp is Dr Parker, uses [facility] in [facility], has transport home by family when needed.    ADOD 2-4 days  BARRIERS IV solumedrol, MRI, neuro consult  DISPO home no needs

## 2024-07-22 NOTE — CARE PLAN
Problem: Pain  Goal: Turns in bed with improved pain control throughout the shift  Outcome: Progressing  Goal: Performs ADL's with improved pain control throughout shift  Outcome: Progressing

## 2024-07-22 NOTE — PROGRESS NOTES
Schoen D Traywick is a 53 y.o. female on day 1 of admission presenting with Seizure (Multi).      Subjective   No acute events. Patient with migraine on am rounds. Regarding her seizure activity last night - she denies aura. 10 point ROS otherwise negative.    Objective     Last Recorded Vitals  /87 (BP Location: Right arm, Patient Position: Lying)   Pulse 81   Temp 35.8 °C (96.4 °F) (Temporal)   Resp 20   Wt 83.9 kg (185 lb)   SpO2 98%   Intake/Output last 3 Shifts:    Intake/Output Summary (Last 24 hours) at 7/22/2024 1506  Last data filed at 7/22/2024 1148  Gross per 24 hour   Intake --   Output 1800 ml   Net -1800 ml       Admission Weight  Weight: 83.9 kg (185 lb) (07/21/24 1551)    Daily Weight  07/21/24 : 83.9 kg (185 lb)    Image Results  ECG 12 lead  Normal sinus rhythm  Nonspecific T wave abnormality  Abnormal ECG  When compared with ECG of 11-JUL-2006 13:34,  Nonspecific T wave abnormality now evident in Lateral leads    Gen: A&O, NAD  Head: Normocephalic, atraumatic  Eyes: no scleral icterus  ENT: mucous membranes moist, no oropharyngeal lesions  Resp: Lungs CTAB  Cardiac: Normal rate, regular rhythm, no murmurs appreciated  Abdomen: Soft, nondistended, nontender, +BS  Neuro: CNII-XII grossly intact  Psych: appropriate mood & affect  Skin: warm, dry, no apparent rashes    Relevant Results  Scheduled medications  [START ON 7/23/2024] amLODIPine, 10 mg, oral, Daily  cloNIDine, 0.1 mg, oral, q12h DODIE  enoxaparin, 40 mg, subcutaneous, q24h  levETIRAcetam, 500 mg, oral, BID  methylPREDNISolone sodium succinate (PF), 1,000 mg, intravenous, q24h  metoprolol tartrate, 50 mg, oral, BID  pantoprazole, 40 mg, oral, Daily before breakfast   Or  pantoprazole, 40 mg, intravenous, Daily before breakfast      Continuous medications  sodium chloride 0.9%, 100 mL/hr, Last Rate: 100 mL/hr (07/21/24 2123)      PRN medications  PRN medications: acetaminophen **OR** acetaminophen **OR** acetaminophen,  butalbital-acetaminophen-caff, diphenhydrAMINE, HYDROmorphone, HYDROmorphone, ondansetron **OR** ondansetron    Results for orders placed or performed during the hospital encounter of 07/21/24 (from the past 24 hour(s))   POCT GLUCOSE   Result Value Ref Range    POCT Glucose 94 74 - 99 mg/dL   CBC and Auto Differential   Result Value Ref Range    WBC 7.0 4.4 - 11.3 x10*3/uL    nRBC 0.0 0.0 - 0.0 /100 WBCs    RBC 4.61 4.00 - 5.20 x10*6/uL    Hemoglobin 12.5 12.0 - 16.0 g/dL    Hematocrit 42.2 36.0 - 46.0 %    MCV 92 80 - 100 fL    MCH 27.1 26.0 - 34.0 pg    MCHC 29.6 (L) 32.0 - 36.0 g/dL    RDW 15.8 (H) 11.5 - 14.5 %    Platelets 363 150 - 450 x10*3/uL    Neutrophils % 35.7 40.0 - 80.0 %    Immature Granulocytes %, Automated 0.6 0.0 - 0.9 %    Lymphocytes % 47.4 13.0 - 44.0 %    Monocytes % 15.3 2.0 - 10.0 %    Eosinophils % 0.6 0.0 - 6.0 %    Basophils % 0.4 0.0 - 2.0 %    Neutrophils Absolute 2.51 1.20 - 7.70 x10*3/uL    Immature Granulocytes Absolute, Automated 0.04 0.00 - 0.70 x10*3/uL    Lymphocytes Absolute 3.34 1.20 - 4.80 x10*3/uL    Monocytes Absolute 1.08 (H) 0.10 - 1.00 x10*3/uL    Eosinophils Absolute 0.04 0.00 - 0.70 x10*3/uL    Basophils Absolute 0.03 0.00 - 0.10 x10*3/uL   Comprehensive metabolic panel   Result Value Ref Range    Glucose 87 74 - 99 mg/dL    Sodium 141 136 - 145 mmol/L    Potassium 4.4 3.5 - 5.3 mmol/L    Chloride 104 98 - 107 mmol/L    Bicarbonate 14 (L) 21 - 32 mmol/L    Anion Gap 27 (H) 10 - 20 mmol/L    Urea Nitrogen 14 6 - 23 mg/dL    Creatinine 0.97 0.50 - 1.05 mg/dL    eGFR 69 >60 mL/min/1.73m*2    Calcium 9.1 8.6 - 10.3 mg/dL    Albumin 4.1 3.4 - 5.0 g/dL    Alkaline Phosphatase 134 (H) 33 - 110 U/L    Total Protein 7.1 6.4 - 8.2 g/dL    AST 31 9 - 39 U/L    Bilirubin, Total 0.3 0.0 - 1.2 mg/dL    ALT 13 7 - 45 U/L   Magnesium   Result Value Ref Range    Magnesium 1.90 1.60 - 2.40 mg/dL   Lactate   Result Value Ref Range    Lactate 12.6 (HH) 0.4 - 2.0 mmol/L   Human Chorionic  Gonadotropin, Serum Quantitative   Result Value Ref Range    HCG, Beta-Quantitative 3 <5 mIU/mL   ECG 12 lead   Result Value Ref Range    Ventricular Rate 91 BPM    Atrial Rate 91 BPM    NV Interval 144 ms    QRS Duration 68 ms    QT Interval 328 ms    QTC Calculation(Bazett) 403 ms    P Axis 58 degrees    R Axis 13 degrees    T Axis 21 degrees    QRS Count 15 beats    Q Onset 224 ms    P Onset 152 ms    P Offset 203 ms    T Offset 388 ms    QTC Fredericia 377 ms   Drug Screen, Urine   Result Value Ref Range    Amphetamine Screen, Urine Presumptive Negative Presumptive Negative    Barbiturate Screen, Urine Presumptive Positive (A) Presumptive Negative    Benzodiazepines Screen, Urine Presumptive Positive (A) Presumptive Negative    Cannabinoid Screen, Urine Presumptive Positive (A) Presumptive Negative    Cocaine Metabolite Screen, Urine Presumptive Negative Presumptive Negative    Fentanyl Screen, Urine Presumptive Negative Presumptive Negative    Opiate Screen, Urine Presumptive Negative Presumptive Negative    Oxycodone Screen, Urine Presumptive Positive (A) Presumptive Negative    PCP Screen, Urine Presumptive Negative Presumptive Negative    Methadone Screen, Urine Presumptive Negative Presumptive Negative   Urinalysis with Reflex Culture and Microscopic   Result Value Ref Range    Color, Urine Yellow Light-Yellow, Yellow, Dark-Yellow    Appearance, Urine Clear Clear    Specific Gravity, Urine 1.029 1.005 - 1.035    pH, Urine 6.0 5.0, 5.5, 6.0, 6.5, 7.0, 7.5, 8.0    Protein, Urine 30 (1+) (A) NEGATIVE, 10 (TRACE), 20 (TRACE) mg/dL    Glucose, Urine Normal Normal mg/dL    Blood, Urine NEGATIVE NEGATIVE    Ketones, Urine NEGATIVE NEGATIVE mg/dL    Bilirubin, Urine NEGATIVE NEGATIVE    Urobilinogen, Urine 2 (1+) (A) Normal mg/dL    Nitrite, Urine NEGATIVE NEGATIVE    Leukocyte Esterase, Urine 25 Rob/µL (A) NEGATIVE   Microscopic Only, Urine   Result Value Ref Range    WBC, Urine 1-5 1-5, NONE /HPF    RBC, Urine  NONE NONE, 1-2, 3-5 /HPF    Squamous Epithelial Cells, Urine 1-9 (SPARSE) Reference range not established. /HPF    Mucus, Urine FEW Reference range not established. /LPF   Potassium   Result Value Ref Range    Potassium 3.5 3.5 - 5.3 mmol/L   Magnesium   Result Value Ref Range    Magnesium 1.80 1.60 - 2.40 mg/dL   Lactate   Result Value Ref Range    Lactate 0.8 0.4 - 2.0 mmol/L   POCT GLUCOSE   Result Value Ref Range    POCT Glucose 99 74 - 99 mg/dL   CBC   Result Value Ref Range    WBC 4.6 4.4 - 11.3 x10*3/uL    nRBC 0.0 0.0 - 0.0 /100 WBCs    RBC 4.11 4.00 - 5.20 x10*6/uL    Hemoglobin 11.5 (L) 12.0 - 16.0 g/dL    Hematocrit 35.1 (L) 36.0 - 46.0 %    MCV 85 80 - 100 fL    MCH 28.0 26.0 - 34.0 pg    MCHC 32.8 32.0 - 36.0 g/dL    RDW 15.3 (H) 11.5 - 14.5 %    Platelets 287 150 - 450 x10*3/uL   Basic metabolic panel   Result Value Ref Range    Glucose 155 (H) 74 - 99 mg/dL    Sodium 138 136 - 145 mmol/L    Potassium 4.1 3.5 - 5.3 mmol/L    Chloride 105 98 - 107 mmol/L    Bicarbonate 22 21 - 32 mmol/L    Anion Gap 15 10 - 20 mmol/L    Urea Nitrogen 8 6 - 23 mg/dL    Creatinine 0.77 0.50 - 1.05 mg/dL    eGFR >90 >60 mL/min/1.73m*2    Calcium 9.0 8.6 - 10.3 mg/dL       Assessment/Plan   Ms. Schoen Traywick is a 54 YO with PMH of MS, HTN [place] [facility] [place].    Patient with general TC seizure on 7/21, Keppra/ativan loaded. EEG ordered, awaiting formal neuro recs.    #GTC seizure  -Keppra load, ativan 2 mg IV  -EEG  -Neuro consulted appreciate recs    #Migraine  -Cont PO benadryl PRN, fioricet PRN    #RRMS  -Cont steroid    #HTN  -Cont metop, clonidine, amlodipine    DVT Ppx: lovenox    Dispo: pending Neuro recs    This patient has a central line   Reason for the central line remaining today? Hemodynamic monitoring    Principal Problem:    Seizure (Multi)    Greater than 30 min spent in care of this patient.    Yung Naylor MD

## 2024-07-22 NOTE — PROGRESS NOTES
Pharmacy Medication History Review    Schoen D Traywick is a 53 y.o. female admitted for Seizure (Multi). Pharmacy reviewed the patient's ogdsj-py-bumcutcvp medications and allergies for accuracy.    The list below reflectives the updated PTA list. Please review each medication in order reconciliation for additional clarification and justification.  Prior to Admission Medications   Prescriptions Last Dose Informant     DULoxetine (Cymbalta) 30 mg DR capsule       Sig: Take 1 capsule (30 mg) by mouth 2 times a day. Do not crush or chew.   acetaminophen (Tylenol) 325 mg tablet       Sig: Take 2 tablets (650 mg) by mouth every 6 hours if needed.   albuterol 90 mcg/actuation inhaler       Sig: Inhale 2 puffs every 6 hours if needed for wheezing.   amitriptyline (Elavil) 10 mg tablet       Sig: Take 1 tablet (10 mg) by mouth once daily at bedtime.   ascorbic acid (Vitamin C) 250 MG chewable tablet       Sig: Chew 1 tablet (250 mg) once daily.   butalbital-acetaminophen-caff -40 mg tablet       Sig: Take 1.5 tablets by mouth once daily as needed for headaches or migraine.   cholecalciferol (Vitamin D-3) 5,000 Units tablet       Sig: Take 1 tablet (5,000 Units) by mouth once daily.   diphenhydrAMINE (BENADryl) 50 mg tablet       Sig: Take 1 tablet (50 mg) by mouth every 6 hours if needed for itching.   dronabinol (Marinol) 10 mg capsule       Sig: Take 1 capsule (10 mg) by mouth 2 times a day before meals.   ergocalciferol (Vitamin D-2) 1.25 MG (72329 UT) capsule       Sig: Take 1 capsule (1,250 mcg) by mouth 1 (one) time per week. MONDAY   famotidine (Pepcid) 40 mg tablet       Sig: Take 1 tablet (40 mg) by mouth once daily.   gabapentin (Neurontin) 300 mg capsule       Sig: Take 2 capsules (600 mg) by mouth 3 times a day.   levothyroxine (Synthroid, Levoxyl) 100 mcg tablet       Sig: Take 1 tablet (100 mcg) by mouth early in the morning.. Take on an empty stomach at the same time each day, either 30 to 60 minutes  "prior to breakfast   losartan (Cozaar) 50 mg tablet       Sig: Take 1 tablet (50 mg) by mouth 2 times a day.   magnesium oxide (Mag-Ox) 400 mg tablet       Sig: Take 1 tablet (400 mg) by mouth once daily at bedtime.   melatonin 3 mg tablet       Sig: Take 2 tablets (6 mg) by mouth as needed at bedtime for sleep.   multivitamin with minerals tablet       Sig: Take 1 tablet by mouth once daily.   naloxone (Narcan) 4 mg/0.1 mL nasal spray       Sig: Administer 1 spray (4 mg) into affected nostril(s) if needed for opioid reversal. May repeat every 2-3 minutes if needed, alternating nostrils, until medical assistance becomes available.   oxyCODONE-acetaminophen (Percocet) 5-325 mg tablet       Sig: Take 1 tablet by mouth every 6 hours if needed for severe pain (7 - 10).   pantoprazole (ProtoNix) 40 mg EC tablet       Sig: Take 1 tablet (40 mg) by mouth once daily in the morning. Take before meals. Do not crush, chew, or split.   polyethylene glycol (Glycolax, Miralax) 17 gram packet       Sig: Take 34 g by mouth 2 times a day before meals.   potassium chloride CR 10 mEq ER tablet       Sig: Take 1 tablet (10 mEq) by mouth every other day. Do not crush, chew, or split.   tiZANidine (Zanaflex) 4 mg tablet       Sig: Take 1 tablet (4 mg) by mouth 3 times a day as needed for muscle spasms.   topiramate (Topamax) 25 mg tablet       Sig: Take 1 tablet (25 mg) by mouth once daily at bedtime.   verapamil (Calan) 120 mg tablet       Sig: Take 1 tablet (120 mg) by mouth 2 times a day.      Facility-Administered Medications: None      Allergies  Reviewed by Jennifer Linares on 7/22/2024        Severity Reactions Comments    Baclofen High Anaphylaxis     Iodinated Contrast Media High Other Pt states it \"put her into a coma\"    Morphine High Anaphylaxis             Below are additional concerns with the patient's PTA list.  Pharmacy verified most recent fills.    Jennifer Linares    "

## 2024-07-22 NOTE — CARE PLAN
The patient's goals for the shift include to have no pain.     The clinical goals for the shift include pt having adequate pain and blood pressure control.

## 2024-07-23 LAB
ALBUMIN SERPL BCP-MCNC: 3.9 G/DL (ref 3.4–5)
ALP SERPL-CCNC: 106 U/L (ref 33–110)
ALT SERPL W P-5'-P-CCNC: 12 U/L (ref 7–45)
ANION GAP SERPL CALC-SCNC: 13 MMOL/L (ref 10–20)
AST SERPL W P-5'-P-CCNC: 13 U/L (ref 9–39)
BASOPHILS # BLD AUTO: 0.01 X10*3/UL (ref 0–0.1)
BASOPHILS NFR BLD AUTO: 0.1 %
BILIRUB SERPL-MCNC: 0.2 MG/DL (ref 0–1.2)
BUN SERPL-MCNC: 9 MG/DL (ref 6–23)
CALCIUM SERPL-MCNC: 9.1 MG/DL (ref 8.6–10.3)
CHLORIDE SERPL-SCNC: 107 MMOL/L (ref 98–107)
CO2 SERPL-SCNC: 25 MMOL/L (ref 21–32)
CREAT SERPL-MCNC: 0.8 MG/DL (ref 0.5–1.05)
EGFRCR SERPLBLD CKD-EPI 2021: 87 ML/MIN/1.73M*2
EOSINOPHIL # BLD AUTO: 0 X10*3/UL (ref 0–0.7)
EOSINOPHIL NFR BLD AUTO: 0 %
ERYTHROCYTE [DISTWIDTH] IN BLOOD BY AUTOMATED COUNT: 15.3 % (ref 11.5–14.5)
GLUCOSE SERPL-MCNC: 130 MG/DL (ref 74–99)
HCT VFR BLD AUTO: 34.5 % (ref 36–46)
HGB BLD-MCNC: 11.2 G/DL (ref 12–16)
IMM GRANULOCYTES # BLD AUTO: 0.05 X10*3/UL (ref 0–0.7)
IMM GRANULOCYTES NFR BLD AUTO: 0.5 % (ref 0–0.9)
LYMPHOCYTES # BLD AUTO: 1.05 X10*3/UL (ref 1.2–4.8)
LYMPHOCYTES NFR BLD AUTO: 10.1 %
MCH RBC QN AUTO: 27.7 PG (ref 26–34)
MCHC RBC AUTO-ENTMCNC: 32.5 G/DL (ref 32–36)
MCV RBC AUTO: 85 FL (ref 80–100)
MONOCYTES # BLD AUTO: 0.19 X10*3/UL (ref 0.1–1)
MONOCYTES NFR BLD AUTO: 1.8 %
NEUTROPHILS # BLD AUTO: 9.06 X10*3/UL (ref 1.2–7.7)
NEUTROPHILS NFR BLD AUTO: 87.5 %
NRBC BLD-RTO: 0 /100 WBCS (ref 0–0)
PLATELET # BLD AUTO: 300 X10*3/UL (ref 150–450)
POTASSIUM SERPL-SCNC: 4.2 MMOL/L (ref 3.5–5.3)
PROT SERPL-MCNC: 6 G/DL (ref 6.4–8.2)
RBC # BLD AUTO: 4.04 X10*6/UL (ref 4–5.2)
SODIUM SERPL-SCNC: 141 MMOL/L (ref 136–145)
WBC # BLD AUTO: 10.4 X10*3/UL (ref 4.4–11.3)

## 2024-07-23 PROCEDURE — 84075 ASSAY ALKALINE PHOSPHATASE: CPT | Performed by: STUDENT IN AN ORGANIZED HEALTH CARE EDUCATION/TRAINING PROGRAM

## 2024-07-23 PROCEDURE — 2500000001 HC RX 250 WO HCPCS SELF ADMINISTERED DRUGS (ALT 637 FOR MEDICARE OP): Performed by: INTERNAL MEDICINE

## 2024-07-23 PROCEDURE — 2500000001 HC RX 250 WO HCPCS SELF ADMINISTERED DRUGS (ALT 637 FOR MEDICARE OP): Performed by: STUDENT IN AN ORGANIZED HEALTH CARE EDUCATION/TRAINING PROGRAM

## 2024-07-23 PROCEDURE — 2500000004 HC RX 250 GENERAL PHARMACY W/ HCPCS (ALT 636 FOR OP/ED): Performed by: INTERNAL MEDICINE

## 2024-07-23 PROCEDURE — 1200000002 HC GENERAL ROOM WITH TELEMETRY DAILY

## 2024-07-23 PROCEDURE — 85025 COMPLETE CBC W/AUTO DIFF WBC: CPT | Performed by: STUDENT IN AN ORGANIZED HEALTH CARE EDUCATION/TRAINING PROGRAM

## 2024-07-23 PROCEDURE — 99232 SBSQ HOSP IP/OBS MODERATE 35: CPT | Performed by: STUDENT IN AN ORGANIZED HEALTH CARE EDUCATION/TRAINING PROGRAM

## 2024-07-23 PROCEDURE — 2500000005 HC RX 250 GENERAL PHARMACY W/O HCPCS: Performed by: STUDENT IN AN ORGANIZED HEALTH CARE EDUCATION/TRAINING PROGRAM

## 2024-07-23 PROCEDURE — 2500000004 HC RX 250 GENERAL PHARMACY W/ HCPCS (ALT 636 FOR OP/ED): Performed by: STUDENT IN AN ORGANIZED HEALTH CARE EDUCATION/TRAINING PROGRAM

## 2024-07-23 RX ORDER — MORPHINE SULFATE 4 MG/ML
4 INJECTION, SOLUTION INTRAMUSCULAR; INTRAVENOUS ONCE
Status: DISCONTINUED | OUTPATIENT
Start: 2024-07-23 | End: 2024-07-23

## 2024-07-23 RX ORDER — BUTALBITAL, ACETAMINOPHEN AND CAFFEINE 50; 325; 40 MG/1; MG/1; MG/1
1 TABLET ORAL EVERY 4 HOURS PRN
Status: DISCONTINUED | OUTPATIENT
Start: 2024-07-23 | End: 2024-07-23 | Stop reason: SDUPTHER

## 2024-07-23 RX ORDER — LOSARTAN POTASSIUM 50 MG/1
50 TABLET ORAL 2 TIMES DAILY
Status: DISCONTINUED | OUTPATIENT
Start: 2024-07-23 | End: 2024-07-24 | Stop reason: HOSPADM

## 2024-07-23 RX ORDER — PREDNISONE 20 MG/1
60 TABLET ORAL DAILY
Status: DISCONTINUED | OUTPATIENT
Start: 2024-07-24 | End: 2024-07-24

## 2024-07-23 RX ADMIN — LEVETIRACETAM 500 MG: 500 TABLET, FILM COATED ORAL at 10:02

## 2024-07-23 RX ADMIN — LOSARTAN POTASSIUM 50 MG: 50 TABLET, FILM COATED ORAL at 22:01

## 2024-07-23 RX ADMIN — HYDROMORPHONE HYDROCHLORIDE 0.4 MG: 1 INJECTION, SOLUTION INTRAMUSCULAR; INTRAVENOUS; SUBCUTANEOUS at 16:43

## 2024-07-23 RX ADMIN — PANTOPRAZOLE SODIUM 40 MG: 40 TABLET, DELAYED RELEASE ORAL at 06:08

## 2024-07-23 RX ADMIN — CLONIDINE HYDROCHLORIDE 0.1 MG: 0.1 TABLET ORAL at 10:02

## 2024-07-23 RX ADMIN — HYDROMORPHONE HYDROCHLORIDE 0.2 MG: 0.2 INJECTION, SOLUTION INTRAMUSCULAR; INTRAVENOUS; SUBCUTANEOUS at 01:39

## 2024-07-23 RX ADMIN — HYDROMORPHONE HYDROCHLORIDE 0.4 MG: 1 INJECTION, SOLUTION INTRAMUSCULAR; INTRAVENOUS; SUBCUTANEOUS at 12:29

## 2024-07-23 RX ADMIN — VALPROATE SODIUM 500 MG: 100 INJECTION, SOLUTION INTRAVENOUS at 13:55

## 2024-07-23 RX ADMIN — BUTALBITAL, ACETAMINOPHEN, AND CAFFEINE 1 TABLET: 325; 50; 40 TABLET ORAL at 09:42

## 2024-07-23 RX ADMIN — BUTALBITAL, ACETAMINOPHEN, AND CAFFEINE 1 TABLET: 325; 50; 40 TABLET ORAL at 05:18

## 2024-07-23 RX ADMIN — ONDANSETRON 4 MG: 2 INJECTION INTRAMUSCULAR; INTRAVENOUS at 12:29

## 2024-07-23 RX ADMIN — METOPROLOL TARTRATE 50 MG: 50 TABLET, FILM COATED ORAL at 10:02

## 2024-07-23 RX ADMIN — LEVETIRACETAM 500 MG: 500 TABLET, FILM COATED ORAL at 22:00

## 2024-07-23 RX ADMIN — HYDROMORPHONE HYDROCHLORIDE 0.4 MG: 1 INJECTION, SOLUTION INTRAMUSCULAR; INTRAVENOUS; SUBCUTANEOUS at 22:06

## 2024-07-23 RX ADMIN — ENOXAPARIN SODIUM 40 MG: 40 INJECTION SUBCUTANEOUS at 22:01

## 2024-07-23 RX ADMIN — HYDROMORPHONE HYDROCHLORIDE 0.4 MG: 1 INJECTION, SOLUTION INTRAMUSCULAR; INTRAVENOUS; SUBCUTANEOUS at 07:36

## 2024-07-23 RX ADMIN — DIPHENHYDRAMINE HYDROCHLORIDE 25 MG: 25 CAPSULE ORAL at 18:14

## 2024-07-23 RX ADMIN — BUTALBITAL, ACETAMINOPHEN, AND CAFFEINE 1 TABLET: 325; 50; 40 TABLET ORAL at 18:14

## 2024-07-23 RX ADMIN — DEXTROSE MONOHYDRATE 1000 MG: 50 INJECTION, SOLUTION INTRAVENOUS at 18:14

## 2024-07-23 RX ADMIN — DIPHENHYDRAMINE HYDROCHLORIDE 25 MG: 25 CAPSULE ORAL at 09:45

## 2024-07-23 RX ADMIN — ACETAMINOPHEN 650 MG: 325 TABLET ORAL at 04:02

## 2024-07-23 RX ADMIN — AMLODIPINE BESYLATE 10 MG: 10 TABLET ORAL at 10:02

## 2024-07-23 ASSESSMENT — COGNITIVE AND FUNCTIONAL STATUS - GENERAL
TURNING FROM BACK TO SIDE WHILE IN FLAT BAD: A LITTLE
MOVING TO AND FROM BED TO CHAIR: A LITTLE
DRESSING REGULAR UPPER BODY CLOTHING: A LITTLE
DRESSING REGULAR LOWER BODY CLOTHING: A LITTLE
CLIMB 3 TO 5 STEPS WITH RAILING: A LITTLE
TOILETING: A LITTLE
STANDING UP FROM CHAIR USING ARMS: A LITTLE
MOBILITY SCORE: 19
HELP NEEDED FOR BATHING: A LITTLE
DAILY ACTIVITIY SCORE: 20
WALKING IN HOSPITAL ROOM: A LITTLE

## 2024-07-23 ASSESSMENT — PAIN SCALES - GENERAL
PAINLEVEL_OUTOF10: 6
PAINLEVEL_OUTOF10: 3
PAINLEVEL_OUTOF10: 0 - NO PAIN
PAINLEVEL_OUTOF10: 0 - NO PAIN
PAINLEVEL_OUTOF10: 4
PAINLEVEL_OUTOF10: 8

## 2024-07-23 ASSESSMENT — PAIN - FUNCTIONAL ASSESSMENT
PAIN_FUNCTIONAL_ASSESSMENT: 0-10

## 2024-07-23 ASSESSMENT — PAIN DESCRIPTION - LOCATION
LOCATION: HEAD

## 2024-07-23 NOTE — PROGRESS NOTES
Pharmacy Medication History Review~~~PATIENT VERIFIED ALL MEDICATIONS AND DOSES~~~~~~    Schoen D Pio is a 53 y.o. female admitted for Seizure (Multi). Pharmacy reviewed the patient's naapd-lq-tyxbrznim medications and allergies for accuracy.    The list below reflectives the updated PTA list. Please review each medication in order reconciliation for additional clarification and justification.  Prior to Admission Medications   Prescriptions Last Dose Informant     DULoxetine (Cymbalta) 30 mg DR capsule 7/20/2024      Sig: Take 1 capsule (30 mg) by mouth 2 times a day. Do not crush or chew.             albuterol 90 mcg/actuation inhaler Past Week      Sig: Inhale 2 puffs every 6 hours if needed for wheezing.   amitriptyline (Elavil) 10 mg tablet 7/20/2024      Sig: Take 1 tablet (10 mg) by mouth once daily at bedtime.   ascorbic acid (Vitamin C) 250 MG chewable tablet 7/20/2024      Sig: Chew 1 tablet (250 mg) once daily.   butalbital-acetaminophen-caff -40 mg tablet Past Week      Sig: Take 1.5 tablets by mouth once daily as needed for headaches or migraine.   cholecalciferol (Vitamin D-3) 5,000 Units tablet 7/20/2024      Sig: Take 1 tablet (5,000 Units) by mouth once daily.   diphenhydrAMINE (BENADryl) 50 mg tablet Past Week      Sig: Take 1 tablet (50 mg) by mouth every 6 hours if needed for itching.   dronabinol (Marinol) 10 mg capsule 7/20/2024      Sig: Take 1 capsule (10 mg) by mouth 2 times a day before meals.   ergocalciferol (Vitamin D-2) 1.25 MG (32700 UT) capsule 7/15/2024      Sig: Take 1 capsule (1,250 mcg) by mouth 1 (one) time per week. MONDAY   famotidine (Pepcid) 40 mg tablet 7/20/2024      Sig: Take 1 tablet (40 mg) by mouth once daily.   gabapentin (Neurontin) 300 mg capsule 7/20/2024      Sig: Take 2 capsules (600 mg) by mouth 3 times a day.   levothyroxine (Synthroid, Levoxyl) 100 mcg tablet 7/20/2024      Sig: Take 1 tablet (100 mcg) by mouth early in the morning.. Take on an empty  "stomach at the same time each day, either 30 to 60 minutes prior to breakfast   losartan (Cozaar) 50 mg tablet 7/20/2024      Sig: Take 1 tablet (50 mg) by mouth 2 times a day.   magnesium oxide (Mag-Ox) 400 mg tablet 7/20/2024      Sig: Take 1 tablet (400 mg) by mouth once daily at bedtime.             multivitamin with minerals tablet       Sig: Take 1 tablet by mouth once daily.   naloxone (Narcan) 4 mg/0.1 mL nasal spray       Sig: Administer 1 spray (4 mg) into affected nostril(s) if needed for opioid reversal. May repeat every 2-3 minutes if needed, alternating nostrils, until medical assistance becomes available.   oxyCODONE-acetaminophen (Percocet) 5-325 mg tablet       Sig: Take 1 tablet by mouth every 6 hours if needed for severe pain (7 - 10).   pantoprazole (ProtoNix) 40 mg EC tablet       Sig: Take 1 tablet (40 mg) by mouth once daily in the morning. Take before meals. Do not crush, chew, or split.             potassium chloride CR 10 mEq ER tablet       Sig: Take 1 tablet (10 mEq) by mouth every other day. Do not crush, chew, or split.   tiZANidine (Zanaflex) 4 mg tablet       Sig: Take 1 tablet (4 mg) by mouth 3 times a day as needed for muscle spasms.   topiramate (Topamax) 25 mg tablet       Sig: Take 1 tablet (25 mg) by mouth once daily at bedtime.   verapamil (Calan) 120 mg tablet       Sig: Take 1 tablet (120 mg) by mouth 2 times a day.      Facility-Administered Medications: None      Allergies  Reviewed by Jennifer Linares on 7/22/2024        Severity Reactions Comments    Baclofen High Anaphylaxis     Iodinated Contrast Media High Other Pt states it \"put her into a coma\"    Morphine High Anaphylaxis             Below are additional concerns with the patient's PTA list.  Patient verified all medications and doses.    Jennifer Linares    "

## 2024-07-23 NOTE — PROGRESS NOTES
Schoen D Traywick is a 53 y.o. female on day 2 of admission presenting with Seizure (Multi).    Subjective   No acute events. Patient endorsing ongoing migraine. No evidence of ongoing seizure. 10 point ROS otherwise negative.    Objective      Gen: A&O, NAD  Head: Normocephalic, atraumatic  Eyes: no scleral icterus  ENT: mucous membranes moist, no oropharyngeal lesions  Resp: Lungs CTAB  Cardiac: Normal rate, regular rhythm, no murmurs appreciated  Abdomen: Soft, nondistended, nontender, +BS  Neuro: CNII-XII grossly intact  Psych: appropriate mood & affect  Skin: warm, dry, no apparent rashes    Last Recorded Vitals  /78   Pulse 71   Temp 36.6 °C (97.8 °F) (Temporal)   Resp 20   Wt 83.9 kg (185 lb)   SpO2 96%   Intake/Output last 3 Shifts:    Intake/Output Summary (Last 24 hours) at 7/23/2024 1448  Last data filed at 7/23/2024 1439  Gross per 24 hour   Intake 1406 ml   Output 1400 ml   Net 6 ml       Admission Weight  Weight: 83.9 kg (185 lb) (07/21/24 1551)    Daily Weight  07/21/24 : 83.9 kg (185 lb)    Image Results  ECG 12 lead  Normal sinus rhythm  Nonspecific T wave abnormality  Abnormal ECG  When compared with ECG of 11-JUL-2006 13:34,  Nonspecific T wave abnormality now evident in Lateral leads  See ED provider note for full interpretation and clinical correlation  Confirmed by Rayna Merchant ([id number]) on 7/22/2024 5:24:51 PM  MR cervical spine wo IV contrast, MR lumbar spine wo IV contrast, MR thoracic spine wo IV contrast  Narrative: Interpreted By:  Nate Pringle,   STUDY:  MR CERVICAL SPINE WO IV CONTRAST; MR THORACIC SPINE WO IV CONTRAST;  MR LUMBAR SPINE WO IV CONTRAST;  7/22/2024 3:40 pm; 7/22/2024 3:30  pm; 7/22/2024 3:25 pm      INDICATION:  Signs/Symptoms:MS flare.      ACCESSION NUMBER(S):  [id number]; [id number]; [id number]      ORDERING CLINICIAN:  MANUELA ROLLINS      TECHNIQUE:  Sagittal T1, T2, STIR, axial T1 and axial T2 weighted images were  acquired through the cervical, thoracic  and lumbar spine.      FINDINGS:  SPINAL CORD:      The spinal cord has a 17 mm length of hyperintensity centrally and to  the right posterior to the T4 vertebral body to the T4-5 disc level.  An additional central left focus is noted at T7 with questionable  focus on the right at T8-9 and T9-10. The tip of the spinal cord ends  normally at T12-L1.      THORACIC SPINE:      There are 12 rib-bearing thoracic vertebrae.      No degenerative stenoses of the thoracic spinal canal are noted.      There are 5 lumbar type vertebrae.      No degenerative stenoses of the spinal canal are noted. The left L3-4  and L4-5 neural foramina are moderately stenosed by facet hypertrophy  and mild disc bulge.      The L2-3 through L4-5 discs have minimal degenerative desiccation.  The vertebral body and disc space heights are normal.              Impression: The spinal cord has focal to linear hyperintensities most prominent  at T4 with additional foci at T7, T8-9 and T9-10.      No contrast was administered due to severe allergy; it is difficult  to ascertain whether these represent acute or chronic lesions due to  the lack of contrast as well as the lack of comparison exams.      MACRO:  None      Signed by: Nate Pringle 7/22/2024 5:05 PM  Dictation workstation:   [id number]  MR brain wo IV contrast  Narrative: Interpreted By:  Nate Pringle,   STUDY:  MR BRAIN WO IV CONTRAST;  7/22/2024 3:40 pm      INDICATION:  Signs/Symptoms:MS flare.      COMPARISON:  None.      ACCESSION NUMBER(S):  [id number]      ORDERING CLINICIAN:  MANUELA ROLLINS      TECHNIQUE:  Axial T2, FLAIR, DWI, gradient echo T2 and sagittal and coronal T1  weighted images of brain were acquired.      FINDINGS:  No contrast was administered due to severe contrast allergy.      CSF Spaces: The ventricles, sulci and basal cisterns are within  normal limits.      Parenchyma: The white matter has multiple lesions consistent with  demyelinating disease in the  periventricular region with a few in the  subcortical regions mostly temporal lobes bilaterally. These ins are  also noted in the left midbrain long the quadrigeminal plates and  dorsal left midline of the francisco near the floor of the 4th ventricle.      No acute infarct, hemorrhage or mass is noted.      Paranasal Sinuses and Mastoids: Visualized paranasal sinuses and  mastoid air cells are unremarkable.      Impression: Multiple abnormalities consistent with multiple sclerosis are noted  in the white matter, midbrain and francisco. No contrast was administered  because of severe allergies so that active demyelination is difficult  to exclude. Also no prior images were available for comparison.      MACRO:  None      Signed by: Nate Pringle 7/22/2024 4:45 PM  Dictation workstation:   [id number]  EEG  IMPRESSION    Impression  This routine EEG is normal. No epileptiform activity is recorded.  A full report will be scanned into the patient's chart at a later time.    This report has been interpreted and electronically signed by    Relevant Results  Scheduled medications  amLODIPine, 10 mg, oral, Daily  cloNIDine, 0.1 mg, oral, q12h DODIE  enoxaparin, 40 mg, subcutaneous, q24h  levETIRAcetam, 500 mg, oral, BID  methylPREDNISolone sodium succinate (PF), 1,000 mg, intravenous, q24h  metoprolol tartrate, 50 mg, oral, BID  pantoprazole, 40 mg, oral, Daily before breakfast   Or  pantoprazole, 40 mg, intravenous, Daily before breakfast  [START ON 7/24/2024] predniSONE, 60 mg, oral, Daily  valproate sodium, 500 mg, intravenous, Once      Continuous medications     PRN medications  PRN medications: acetaminophen **OR** acetaminophen **OR** acetaminophen, butalbital-acetaminophen-caff, diphenhydrAMINE, HYDROmorphone, HYDROmorphone, ondansetron **OR** ondansetron    Results for orders placed or performed during the hospital encounter of 07/21/24 (from the past 24 hour(s))   CBC and Auto Differential   Result Value Ref Range    WBC 10.4  4.4 - 11.3 x10*3/uL    nRBC 0.0 0.0 - 0.0 /100 WBCs    RBC 4.04 4.00 - 5.20 x10*6/uL    Hemoglobin 11.2 (L) 12.0 - 16.0 g/dL    Hematocrit 34.5 (L) 36.0 - 46.0 %    MCV 85 80 - 100 fL    MCH 27.7 26.0 - 34.0 pg    MCHC 32.5 32.0 - 36.0 g/dL    RDW 15.3 (H) 11.5 - 14.5 %    Platelets 300 150 - 450 x10*3/uL    Neutrophils % 87.5 40.0 - 80.0 %    Immature Granulocytes %, Automated 0.5 0.0 - 0.9 %    Lymphocytes % 10.1 13.0 - 44.0 %    Monocytes % 1.8 2.0 - 10.0 %    Eosinophils % 0.0 0.0 - 6.0 %    Basophils % 0.1 0.0 - 2.0 %    Neutrophils Absolute 9.06 (H) 1.20 - 7.70 x10*3/uL    Immature Granulocytes Absolute, Automated 0.05 0.00 - 0.70 x10*3/uL    Lymphocytes Absolute 1.05 (L) 1.20 - 4.80 x10*3/uL    Monocytes Absolute 0.19 0.10 - 1.00 x10*3/uL    Eosinophils Absolute 0.00 0.00 - 0.70 x10*3/uL    Basophils Absolute 0.01 0.00 - 0.10 x10*3/uL   Comprehensive Metabolic Panel   Result Value Ref Range    Glucose 130 (H) 74 - 99 mg/dL    Sodium 141 136 - 145 mmol/L    Potassium 4.2 3.5 - 5.3 mmol/L    Chloride 107 98 - 107 mmol/L    Bicarbonate 25 21 - 32 mmol/L    Anion Gap 13 10 - 20 mmol/L    Urea Nitrogen 9 6 - 23 mg/dL    Creatinine 0.80 0.50 - 1.05 mg/dL    eGFR 87 >60 mL/min/1.73m*2    Calcium 9.1 8.6 - 10.3 mg/dL    Albumin 3.9 3.4 - 5.0 g/dL    Alkaline Phosphatase 106 33 - 110 U/L    Total Protein 6.0 (L) 6.4 - 8.2 g/dL    AST 13 9 - 39 U/L    Bilirubin, Total 0.2 0.0 - 1.2 mg/dL    ALT 12 7 - 45 U/L     Assessment/Plan   Ms. Schoen Traywick is a 54 YO with PMH of MS, HTN admitted to [facility] for seizure like activity on 7/21.     Patient with general TC seizure on 7/21, Keppra/ativan loaded. EEG ordered, will continue keppra.    Awaiting neuro finalized recommendations - possible discharge on 7/24.     #GTC seizure  -Keppra load, ativan 2 mg IV  -EEG  -Neuro consulted appreciate recs     #Migraine  -Cont PO benadryl PRN, fioricet PRN  -Trialing 50 mg depakote     #RRMS  -Cont steroid for 1 more day     #HTN  -Cont  metop, clonidine, amlodipine     DVT Ppx: lovenox     Dispo: pending Neuro final recs     This patient has a central line              Reason for the central line remaining today? Hemodynamic monitoring     Principal Problem:    Seizure (Multi)     Greater than 30 min spent in care of this patient.     Yung Naylor MD

## 2024-07-24 ENCOUNTER — PHARMACY VISIT (OUTPATIENT)
Dept: PHARMACY | Facility: CLINIC | Age: 54
End: 2024-07-24
Payer: COMMERCIAL

## 2024-07-24 ENCOUNTER — APPOINTMENT (OUTPATIENT)
Dept: OPHTHALMOLOGY | Facility: CLINIC | Age: 54
End: 2024-07-24
Payer: COMMERCIAL

## 2024-07-24 VITALS
BODY MASS INDEX: 30.82 KG/M2 | SYSTOLIC BLOOD PRESSURE: 147 MMHG | HEART RATE: 64 BPM | OXYGEN SATURATION: 94 % | RESPIRATION RATE: 16 BRPM | HEIGHT: 65 IN | DIASTOLIC BLOOD PRESSURE: 80 MMHG | WEIGHT: 185 LBS | TEMPERATURE: 98.6 F

## 2024-07-24 LAB
ALBUMIN SERPL BCP-MCNC: 4 G/DL (ref 3.4–5)
ALP SERPL-CCNC: 123 U/L (ref 33–110)
ALT SERPL W P-5'-P-CCNC: 28 U/L (ref 7–45)
ANION GAP SERPL CALC-SCNC: 14 MMOL/L (ref 10–20)
AST SERPL W P-5'-P-CCNC: 22 U/L (ref 9–39)
BACTERIA UR CULT: ABNORMAL
BASOPHILS # BLD AUTO: 0.01 X10*3/UL (ref 0–0.1)
BASOPHILS NFR BLD AUTO: 0.1 %
BILIRUB SERPL-MCNC: 0.2 MG/DL (ref 0–1.2)
BUN SERPL-MCNC: 7 MG/DL (ref 6–23)
CALCIUM SERPL-MCNC: 9.2 MG/DL (ref 8.6–10.3)
CHLORIDE SERPL-SCNC: 104 MMOL/L (ref 98–107)
CO2 SERPL-SCNC: 27 MMOL/L (ref 21–32)
CREAT SERPL-MCNC: 0.86 MG/DL (ref 0.5–1.05)
EGFRCR SERPLBLD CKD-EPI 2021: 80 ML/MIN/1.73M*2
EOSINOPHIL # BLD AUTO: 0 X10*3/UL (ref 0–0.7)
EOSINOPHIL NFR BLD AUTO: 0 %
ERYTHROCYTE [DISTWIDTH] IN BLOOD BY AUTOMATED COUNT: 15.2 % (ref 11.5–14.5)
GLUCOSE SERPL-MCNC: 132 MG/DL (ref 74–99)
HCT VFR BLD AUTO: 37.6 % (ref 36–46)
HGB BLD-MCNC: 12 G/DL (ref 12–16)
IMM GRANULOCYTES # BLD AUTO: 0.1 X10*3/UL (ref 0–0.7)
IMM GRANULOCYTES NFR BLD AUTO: 0.9 % (ref 0–0.9)
LYMPHOCYTES # BLD AUTO: 0.75 X10*3/UL (ref 1.2–4.8)
LYMPHOCYTES NFR BLD AUTO: 6.7 %
MCH RBC QN AUTO: 27.6 PG (ref 26–34)
MCHC RBC AUTO-ENTMCNC: 31.9 G/DL (ref 32–36)
MCV RBC AUTO: 86 FL (ref 80–100)
MONOCYTES # BLD AUTO: 0.18 X10*3/UL (ref 0.1–1)
MONOCYTES NFR BLD AUTO: 1.6 %
NEUTROPHILS # BLD AUTO: 10.23 X10*3/UL (ref 1.2–7.7)
NEUTROPHILS NFR BLD AUTO: 90.7 %
NRBC BLD-RTO: 0 /100 WBCS (ref 0–0)
PLATELET # BLD AUTO: 333 X10*3/UL (ref 150–450)
POTASSIUM SERPL-SCNC: 3.9 MMOL/L (ref 3.5–5.3)
PROT SERPL-MCNC: 6.2 G/DL (ref 6.4–8.2)
RBC # BLD AUTO: 4.35 X10*6/UL (ref 4–5.2)
SODIUM SERPL-SCNC: 141 MMOL/L (ref 136–145)
WBC # BLD AUTO: 11.3 X10*3/UL (ref 4.4–11.3)

## 2024-07-24 PROCEDURE — 2500000005 HC RX 250 GENERAL PHARMACY W/O HCPCS: Performed by: STUDENT IN AN ORGANIZED HEALTH CARE EDUCATION/TRAINING PROGRAM

## 2024-07-24 PROCEDURE — RXMED WILLOW AMBULATORY MEDICATION CHARGE

## 2024-07-24 PROCEDURE — 84075 ASSAY ALKALINE PHOSPHATASE: CPT | Performed by: STUDENT IN AN ORGANIZED HEALTH CARE EDUCATION/TRAINING PROGRAM

## 2024-07-24 PROCEDURE — 2500000004 HC RX 250 GENERAL PHARMACY W/ HCPCS (ALT 636 FOR OP/ED): Performed by: INTERNAL MEDICINE

## 2024-07-24 PROCEDURE — 85025 COMPLETE CBC W/AUTO DIFF WBC: CPT | Performed by: STUDENT IN AN ORGANIZED HEALTH CARE EDUCATION/TRAINING PROGRAM

## 2024-07-24 PROCEDURE — 2500000004 HC RX 250 GENERAL PHARMACY W/ HCPCS (ALT 636 FOR OP/ED): Performed by: STUDENT IN AN ORGANIZED HEALTH CARE EDUCATION/TRAINING PROGRAM

## 2024-07-24 PROCEDURE — 2500000001 HC RX 250 WO HCPCS SELF ADMINISTERED DRUGS (ALT 637 FOR MEDICARE OP): Performed by: INTERNAL MEDICINE

## 2024-07-24 PROCEDURE — 2500000001 HC RX 250 WO HCPCS SELF ADMINISTERED DRUGS (ALT 637 FOR MEDICARE OP): Performed by: STUDENT IN AN ORGANIZED HEALTH CARE EDUCATION/TRAINING PROGRAM

## 2024-07-24 PROCEDURE — 99239 HOSP IP/OBS DSCHRG MGMT >30: CPT | Performed by: STUDENT IN AN ORGANIZED HEALTH CARE EDUCATION/TRAINING PROGRAM

## 2024-07-24 RX ORDER — HEPARIN 100 UNIT/ML
5 SYRINGE INTRAVENOUS ONCE
Status: COMPLETED | OUTPATIENT
Start: 2024-07-24 | End: 2024-07-24

## 2024-07-24 RX ORDER — LEVETIRACETAM 500 MG/1
500 TABLET ORAL 2 TIMES DAILY
Qty: 60 TABLET | Refills: 0 | Status: SHIPPED | OUTPATIENT
Start: 2024-07-24 | End: 2024-08-23

## 2024-07-24 RX ADMIN — HYDROMORPHONE HYDROCHLORIDE 0.4 MG: 1 INJECTION, SOLUTION INTRAMUSCULAR; INTRAVENOUS; SUBCUTANEOUS at 03:38

## 2024-07-24 RX ADMIN — HYDROMORPHONE HYDROCHLORIDE 0.4 MG: 1 INJECTION, SOLUTION INTRAMUSCULAR; INTRAVENOUS; SUBCUTANEOUS at 16:17

## 2024-07-24 RX ADMIN — BUTALBITAL, ACETAMINOPHEN, AND CAFFEINE 1 TABLET: 325; 50; 40 TABLET ORAL at 00:01

## 2024-07-24 RX ADMIN — HYDROMORPHONE HYDROCHLORIDE 0.4 MG: 1 INJECTION, SOLUTION INTRAMUSCULAR; INTRAVENOUS; SUBCUTANEOUS at 12:12

## 2024-07-24 RX ADMIN — DIPHENHYDRAMINE HYDROCHLORIDE 25 MG: 25 CAPSULE ORAL at 05:08

## 2024-07-24 RX ADMIN — DIPHENHYDRAMINE HYDROCHLORIDE 25 MG: 25 CAPSULE ORAL at 00:02

## 2024-07-24 RX ADMIN — BUTALBITAL, ACETAMINOPHEN, AND CAFFEINE 1 TABLET: 325; 50; 40 TABLET ORAL at 13:21

## 2024-07-24 RX ADMIN — DIPHENHYDRAMINE HYDROCHLORIDE 25 MG: 25 CAPSULE ORAL at 13:21

## 2024-07-24 RX ADMIN — HYDROMORPHONE HYDROCHLORIDE 0.4 MG: 1 INJECTION, SOLUTION INTRAMUSCULAR; INTRAVENOUS; SUBCUTANEOUS at 08:48

## 2024-07-24 RX ADMIN — HEPARIN 500 UNITS: 100 SYRINGE at 16:18

## 2024-07-24 RX ADMIN — DIPHENHYDRAMINE HYDROCHLORIDE 25 MG: 25 CAPSULE ORAL at 08:58

## 2024-07-24 RX ADMIN — BUTALBITAL, ACETAMINOPHEN, AND CAFFEINE 1 TABLET: 325; 50; 40 TABLET ORAL at 08:57

## 2024-07-24 RX ADMIN — LEVETIRACETAM 500 MG: 500 TABLET, FILM COATED ORAL at 08:57

## 2024-07-24 RX ADMIN — BUTALBITAL, ACETAMINOPHEN, AND CAFFEINE 1 TABLET: 325; 50; 40 TABLET ORAL at 05:07

## 2024-07-24 RX ADMIN — LOSARTAN POTASSIUM 50 MG: 50 TABLET, FILM COATED ORAL at 08:57

## 2024-07-24 RX ADMIN — PREDNISONE 60 MG: 20 TABLET ORAL at 08:57

## 2024-07-24 RX ADMIN — PANTOPRAZOLE SODIUM 40 MG: 40 TABLET, DELAYED RELEASE ORAL at 08:57

## 2024-07-24 RX ADMIN — VALPROATE SODIUM 500 MG: 100 INJECTION, SOLUTION INTRAVENOUS at 12:12

## 2024-07-24 ASSESSMENT — PAIN SCALES - GENERAL
PAINLEVEL_OUTOF10: 5 - MODERATE PAIN
PAINLEVEL_OUTOF10: 5 - MODERATE PAIN
PAINLEVEL_OUTOF10: 8
PAINLEVEL_OUTOF10: 7
PAINLEVEL_OUTOF10: 8
PAINLEVEL_OUTOF10: 7
PAINLEVEL_OUTOF10: 7
PAINLEVEL_OUTOF10: 5 - MODERATE PAIN
PAINLEVEL_OUTOF10: 8
PAINLEVEL_OUTOF10: 7
PAINLEVEL_OUTOF10: 5 - MODERATE PAIN

## 2024-07-24 ASSESSMENT — PAIN - FUNCTIONAL ASSESSMENT
PAIN_FUNCTIONAL_ASSESSMENT: 0-10

## 2024-07-24 ASSESSMENT — PAIN DESCRIPTION - ORIENTATION
ORIENTATION: RIGHT;LEFT
ORIENTATION: RIGHT;LEFT

## 2024-07-24 ASSESSMENT — PAIN DESCRIPTION - DESCRIPTORS
DESCRIPTORS: ACHING

## 2024-07-24 ASSESSMENT — PAIN DESCRIPTION - LOCATION
LOCATION: OTHER (COMMENT)
LOCATION: HEAD
LOCATION: LEG
LOCATION: LEG

## 2024-07-24 NOTE — NURSING NOTE
Discharge instructions provided using teach back method. Pt's health related  risk factors discussed with pt. pt educated to look for any worsening sign and symptoms. Pt educated to seek medical attention if experience any medical emergency. Pt aware to follow up with outpatient clinics as scheduled. Home going meds reviewed with pt. Pt verbalized understanding of disposition and discharge instructions. All questions answered to patient's satisfaction and within nursing scope of practice. Vitals stable, IV to be removed by bedside nurse upon completion of depakote. Pt. Referred to healthy at home.

## 2024-07-24 NOTE — NURSING NOTE
Family here to take patient home, medport was deaccessed, dressing is clean dry intact, patient states has allbelongings, aide took patient to lobby in wheelchair, patient discharged home

## 2024-07-24 NOTE — PROGRESS NOTES
07/24/24 1035   Current Planned Discharge Disposition   Current Planned Discharge Disposition Home     Received message from provider that after he rounds, patient will most likely be ready for discharge today, he is aware that patient has transport home and to appts etc, and lives with her son and grandson.     ADOD today  BARRIERS dc orders after rounding  DISPO home

## 2024-07-24 NOTE — DISCHARGE SUMMARY
Discharge Diagnosis  Seizure (Multi)    Issues Requiring Follow-Up  -Please follow with Dr. Woods for ongoing neurologic care  -Please begin Keppra at 500 mg twice daily    Discharge Meds     Your medication list        START taking these medications        Instructions Last Dose Given Next Dose Due   levETIRAcetam 500 mg tablet  Commonly known as: Keppra      Take 1 tablet (500 mg) by mouth 2 times a day.              CONTINUE taking these medications        Instructions Last Dose Given Next Dose Due   albuterol 90 mcg/actuation inhaler           amitriptyline 10 mg tablet  Commonly known as: Elavil           ascorbic acid 250 MG chewable tablet  Commonly known as: Vitamin C           butalbital-acetaminophen-caff -40 mg tablet           cholecalciferol 5,000 Units tablet  Commonly known as: Vitamin D-3           diphenhydrAMINE 50 mg tablet  Commonly known as: BENADryl           dronabinol 10 mg capsule  Commonly known as: Marinol           DULoxetine 30 mg DR capsule  Commonly known as: Cymbalta           ergocalciferol 1.25 MG (94202 UT) capsule  Commonly known as: Vitamin D-2           famotidine 40 mg tablet  Commonly known as: Pepcid           gabapentin 300 mg capsule  Commonly known as: Neurontin           levothyroxine 100 mcg tablet  Commonly known as: Synthroid, Levoxyl           losartan 50 mg tablet  Commonly known as: Cozaar           magnesium oxide 400 mg tablet  Commonly known as: Mag-Ox           multivitamin with minerals tablet           naloxone 4 mg/0.1 mL nasal spray  Commonly known as: Narcan           oxyCODONE-acetaminophen 5-325 mg tablet  Commonly known as: Percocet           pantoprazole 40 mg EC tablet  Commonly known as: ProtoNix           potassium chloride CR 10 mEq ER tablet  Commonly known as: Klor-Con           tiZANidine 4 mg tablet  Commonly known as: Zanaflex           topiramate 25 mg tablet  Commonly known as: Topamax           verapamil 120 mg tablet  Commonly known  as: Enrique                  STOP taking these medications      acetaminophen 325 mg tablet  Commonly known as: Tylenol        melatonin 3 mg tablet        polyethylene glycol 17 gram packet  Commonly known as: Glycolax, Miralax                  Where to Get Your Medications        These medications were sent to [facility]  [address], [address], [place] [address]      Hours: 8 AM to 6 PM Mon-Fri, 9 AM to 1 PM Saturday Phone: [phone]   levETIRAcetam 500 mg tablet         Test Results Pending At Discharge  Pending Labs       Order Current Status    Extra Urine Gray Tube Collected (07/21/24 3007)    Urinalysis with Reflex Culture and Microscopic In process    Urine Culture Preliminary result          Hospital Course  Ms. Schoen D Traywick is a 53 y.o. female with a past medical history of multiple sclerosis with recent MS flare discharged on 7/9/2024 who presented via EMS to [facility] ER after she had an apparent seizure.  Her family endorses that she had generalized tonic-clonic type activity followed by postictal type recovery state.  While in the ER she had 2 more episodes of general tonic-clonic activity but was immediately awake and alert afterwards.  Her lactate was 12. he was loaded with Keppra in the ER, she also complains of feeling weak all over like she might be having a MS flare.  She resume her 1000 mg of Solu-Medrol in the ER.    She had an additional GTC on arrival to floor. Ekppra loaded and given ativan. Neurology was consulted, EEG obtained, patient was started on keppra as outpt at 500 BID until follow with her outpatient neurologist.     She was discharged to home on 7/24 in stable condition with neurology appointment requested with Dr. Woods.    Pertinent Physical Exam At Time of Discharge  Gen: A&O, NAD  Head: Normocephalic, atraumatic  Eyes: no scleral icterus  ENT: mucous membranes moist, no oropharyngeal lesions  Resp: Lungs CTAB  Cardiac: Normal rate,  regular rhythm, no murmurs appreciated  Abdomen: Soft, nondistended, nontender, +BS  Neuro: CNII-XII grossly intact  Psych: appropriate mood & affect  Skin: warm, dry, no apparent rashes    Outpatient Follow-Up  No future appointments.    Greater than 30 min spent in care of this patient and discharge planning.    Yung Naylor MD

## 2024-07-24 NOTE — CARE PLAN
The patient's goals for the shift include sleep    The clinical goals for the shift include comfort and rest    Over the shift, the patient did not make progress toward the following goals. Barriers to progression include chronic illness. Recommendations to address these barriers include administer sleeping aids and provide a quiet environment.